# Patient Record
Sex: MALE | Race: WHITE | NOT HISPANIC OR LATINO | Employment: UNEMPLOYED | URBAN - METROPOLITAN AREA
[De-identification: names, ages, dates, MRNs, and addresses within clinical notes are randomized per-mention and may not be internally consistent; named-entity substitution may affect disease eponyms.]

---

## 2020-02-07 ENCOUNTER — OFFICE VISIT - HEALTHEAST (OUTPATIENT)
Dept: FAMILY MEDICINE | Facility: CLINIC | Age: 55
End: 2020-02-07

## 2020-02-07 DIAGNOSIS — Z13.220 SCREENING FOR HYPERLIPIDEMIA: ICD-10-CM

## 2020-02-07 DIAGNOSIS — F10.20 UNCOMPLICATED ALCOHOL DEPENDENCE (H): ICD-10-CM

## 2020-02-07 DIAGNOSIS — Z12.11 SCREEN FOR COLON CANCER: ICD-10-CM

## 2020-02-07 DIAGNOSIS — Z00.00 WELL ADULT EXAM: ICD-10-CM

## 2020-02-07 LAB
ALBUMIN SERPL-MCNC: 4.9 G/DL (ref 3.5–5)
ALP SERPL-CCNC: 56 U/L (ref 45–120)
ALT SERPL W P-5'-P-CCNC: 27 U/L (ref 0–45)
ANION GAP SERPL CALCULATED.3IONS-SCNC: 11 MMOL/L (ref 5–18)
AST SERPL W P-5'-P-CCNC: 26 U/L (ref 0–40)
BILIRUB SERPL-MCNC: 0.6 MG/DL (ref 0–1)
BUN SERPL-MCNC: 9 MG/DL (ref 8–22)
CALCIUM SERPL-MCNC: 9.9 MG/DL (ref 8.5–10.5)
CHLORIDE BLD-SCNC: 96 MMOL/L (ref 98–107)
CHOLEST SERPL-MCNC: 217 MG/DL
CO2 SERPL-SCNC: 27 MMOL/L (ref 22–31)
CREAT SERPL-MCNC: 0.76 MG/DL (ref 0.7–1.3)
FASTING STATUS PATIENT QL REPORTED: YES
GFR SERPL CREATININE-BSD FRML MDRD: >60 ML/MIN/1.73M2
GLUCOSE BLD-MCNC: 103 MG/DL (ref 70–125)
HDLC SERPL-MCNC: 94 MG/DL
LDLC SERPL CALC-MCNC: 115 MG/DL
POTASSIUM BLD-SCNC: 4.7 MMOL/L (ref 3.5–5)
PROT SERPL-MCNC: 7.5 G/DL (ref 6–8)
SODIUM SERPL-SCNC: 134 MMOL/L (ref 136–145)
TRIGL SERPL-MCNC: 40 MG/DL

## 2020-02-07 RX ORDER — MULTIPLE VITAMINS W/ MINERALS TAB 9MG-400MCG
1 TAB ORAL DAILY
Status: ON HOLD | COMMUNITY
Start: 2020-02-07 | End: 2023-10-10

## 2020-02-07 ASSESSMENT — MIFFLIN-ST. JEOR: SCORE: 1681.9

## 2020-02-11 ENCOUNTER — COMMUNICATION - HEALTHEAST (OUTPATIENT)
Dept: FAMILY MEDICINE | Facility: CLINIC | Age: 55
End: 2020-02-11

## 2020-02-20 ENCOUNTER — RECORDS - HEALTHEAST (OUTPATIENT)
Dept: ADMINISTRATIVE | Facility: OTHER | Age: 55
End: 2020-02-20

## 2020-02-20 LAB — COLOGUARD-ABSTRACT: NEGATIVE

## 2020-03-05 ENCOUNTER — RECORDS - HEALTHEAST (OUTPATIENT)
Dept: HEALTH INFORMATION MANAGEMENT | Facility: CLINIC | Age: 55
End: 2020-03-05

## 2021-06-04 VITALS
DIASTOLIC BLOOD PRESSURE: 78 MMHG | HEIGHT: 70 IN | BODY MASS INDEX: 26.09 KG/M2 | SYSTOLIC BLOOD PRESSURE: 120 MMHG | WEIGHT: 182.25 LBS | HEART RATE: 68 BPM

## 2021-06-05 NOTE — PROGRESS NOTES
Assessment/ Plan:      1. Well adult exam  Healthy Male exam completed today.  I discussed preventative measures with the patient including continuing with lifestyle modifications of a balanced diet and regular cardiovascular exercise.  I discussed self testicular exams and how to complete these.  I encouraged patient to follow-up yearly for annual physicals and sooner as needed.    2. Uncomplicated alcohol dependence (H)  Concerns for alcohol dependence with his amount of alcohol that he drinks on a regular basis.  Discussed with patient to start reducing alcohol intake in general by reducing the amount of drinks that he has per day or having alcohol free days.  In general he may need to pursue treatment in the future we will check a comprehensive metabolic panel today to assess kidney function.  - Comprehensive Metabolic Panel    3. Screen for colon cancer  Orders placed today for screening for colon cancer to pursue Cologuard.  - Cologuard    4. Screening for hyperlipidemia  Orders placed today patient is fasting.  - Lipid [place] FASTING      Subjective:      Nick Vargas is a 54 y.o. male who presents for an annual exam. The patient reports that there is not domestic violence in his life. Would like to do colon cancer screening with cologuard. He mentions some skin spots that he would like looked at.     Healthy Habits:   Regular Exercise: Yes  Sunscreen Use: No  Healthy Diet: Yes  Dental Visits Regularly: Yes  Seat Belt: Yes  Sexually active: No  Monthly Self Testicular Exams:  Yes  Hemoccults: No  Flex Sig: No  Colonoscopy: No  Lipid Profile: Yes  Glucose Screen: Yes  Prevention of Osteoporosis: N/A  Last Dexa: N/A  Guns at Home:  Yes  Guns Safety Locks:  Yes and Gun safe/class:  Yes        There is no immunization history on file for this patient.  Immunization status: would like to receive influenza vaccine.    No exam data present    Current Outpatient Medications   Medication Sig Dispense Refill      multivitamin with minerals (THERA-M) 9 mg iron-400 mcg Tab tablet Take 1 tablet by mouth daily.       No current facility-administered medications for this visit.      History reviewed. No pertinent past medical history.  History reviewed. No pertinent surgical history.  Naproxen  Family History   Problem Relation Age of Onset     Dementia Mother      Cancer Father      Heart disease Father      Heart attack Father      Social History     Socioeconomic History     Marital status: Single     Spouse name: Not on file     Number of children: Not on file     Years of education: Not on file     Highest education level: Not on file   Occupational History     Not on file   Social Needs     Financial resource strain: Not on file     Food insecurity:     Worry: Not on file     Inability: Not on file     Transportation needs:     Medical: Not on file     Non-medical: Not on file   Tobacco Use     Smoking status: Former Smoker     Last attempt to quit: 2010     Years since quitting: 10.1     Smokeless tobacco: Never Used   Substance and Sexual Activity     Alcohol use: Yes     Alcohol/week: 28.0 standard drinks     Types: 28 Cans of beer per week     Drug use: Never     Sexual activity: Not Currently   Lifestyle     Physical activity:     Days per week: Not on file     Minutes per session: Not on file     Stress: Not on file   Relationships     Social connections:     Talks on phone: Not on file     Gets together: Not on file     Attends Anabaptism service: Not on file     Active member of club or organization: Not on file     Attends meetings of clubs or organizations: Not on file     Relationship status: Not on file     Intimate partner violence:     Fear of current or ex partner: Not on file     Emotionally abused: Not on file     Physically abused: Not on file     Forced sexual activity: Not on file   Other Topics Concern     Not on file   Social History Narrative     Not on file       Review of Systems  General:  Denies  "problem  Eyes: Denies problem  Ears/Nose/Throat: Denies problem  Cardiovascular: Denies problem  Respiratory:  Denies problem  Gastrointestinal:  Denies problem  Genitourinary: Denies problem  Musculoskeletal:  Denies problem  Skin: Denies problem  Neurologic: Denies problem  Psychiatric: Denies problem  Endocrine: Denies problem  Heme/Lymphatic: Denies problem   Allergic/Immunologic: Denies problem        Objective:     Vitals:    02/07/20 0810   BP: 120/78   Pulse: 68   Weight: 182 lb 4 oz (82.7 kg)   Height: 5' 10.25\" (1.784 m)     Body mass index is 25.96 kg/m .    Physical  General Appearance: Alert, cooperative, no distress, appears stated age  Head: Normocephalic, without obvious abnormality, atraumatic  Eyes: PERRL, conjunctiva/corneas clear, EOM's intact  Ears: Normal TM's and external ear canals, both ears  Nose: Nares normal, septum midline,mucosa normal, no drainage  Throat: Lips, mucosa, and tongue normal; teeth and gums normal  Neck: Supple, symmetrical, trachea midline, no adenopathy;  thyroid: not enlarged, symmetric, no tenderness/mass/nodules; no carotid bruit or JVD  Back: Symmetric, no curvature, ROM normal, no CVA tenderness  Lungs: Clear to auscultation bilaterally, respirations unlabored  Heart: Regular rate and rhythm, S1 and S2 normal, no murmur, rub, or gallop,  Abdomen: Soft, non-tender, bowel sounds active all four quadrants,  no masses, no organomegaly  Genitourinary: decline exam.   Musculoskeletal: Normal range of motion. No joint swelling or deformity.   Extremities: Extremities normal, atraumatic, no cyanosis or edema  Skin: Skin color, texture, turgor normal, no rashes or lesions  Lymph nodes: Cervical, supraclavicular, and axillary nodes normal  Neurologic: He is alert. He has normal reflexes.   Psychiatric: He has a normal mood and affect.            "

## 2021-06-16 PROBLEM — F10.20 ALCOHOL DEPENDENCE (H): Status: ACTIVE | Noted: 2020-02-07

## 2021-06-18 NOTE — PATIENT INSTRUCTIONS - HE
Patient Instructions by Annita Gallagher CNP at 2/7/2020  8:00 AM     Author: Annita Gallagher CNP Service: -- Author Type: Nurse Practitioner    Filed: 2/7/2020  8:40 AM Encounter Date: 2/7/2020 Status: Signed    : Annita Gallagher CNP (Nurse Practitioner)         Patient Education     Prevention Guidelines, Men Ages 40 to 49  Screening tests and vaccines are an important part of managing your health. A screening test is done to find possible disorders or diseases in people who don't have any symptoms. The goal is to find a disease early so lifestyle changes can be made and you can be watched more closely to reduce the risk of disease, or to detect it early enough to treat it most effectively. Screening tests are not considered diagnostic, but are used to determine if more testing is needed. Health counseling is essential, too. Below are guidelines for these, for men ages 40 to 49. Talk with your healthcare provider to make sure youre up to date on what you need.  Screening Who needs it How often   Alcohol misuse All men in this age group At routine exams   Blood pressure All men in this age group Yearly checkup if your blood pressure reading is normal  Normal blood pressure is less than 120/80 mm Hg  If your blood pressure is higher than normal, follow the advice of your healthcare provider      Depression All men in this age group At routine exams   Type 2 diabetes or prediabetes All men beginning at age 45 and men  without symptoms at any age who are overweight or obese and have 1 or more other risk factors for diabetes At least every 3 years (yearly if blood sugar has begun to rise)   Type 2 diabetes All men with prediabetes Every year   Hepatitis C Men at increased risk for infection - talk with your healthcare provider At routine exams   High cholesterol or triglycerides All men ages 35 and older, and younger men at high risk for coronary artery disease At least every 5 years   HIV All  men At routine exams   Obesity All men in this age group At routine exams   Prostate cancer Starting at age 45, talk to healthcare provider about risks and benefits of digital rectal exam (VANI) and prostate-specific antigen (PSA) screening1 At routine exams   Syphilis Men at increased risk for infection - talk with your healthcare provider At routine exams   Tuberculosis Men at increased risk for infection - talk with your healthcare provider Check with your healthcare provider   Vision All men in this age group Every 2 to 4 years if no risk factors for eye disease2   Vaccine Who needs it How often   Chickenpox (varicella) All men in this age group who have no record of this infection or vaccine 2 doses; the second dose should be given at least 4 weeks after the first dose   Hepatitis A Men at increased risk for infection - talk with your healthcare provider 2 doses given at least 6 months apart   Hepatitis B Men at increased risk for infection - talk with your healthcare provider 3 doses over 6 months; second dose should be given 1 month after the first dose; the third dose should be given at least 2 months after the second dose and at least 4 months after the first dose   Haemophilus influenzae Type B (HIB) Men at increased risk for infection - talk with your healthcare provider 1 to 3 doses   Influenza (flu) All men in this age group Once a year   Measles, mumps, rubella (MMR) All men in this age group who have no record of these infections or vaccines 1 or 2 doses   Meningococcal Men at increased risk for infection - talk with your healthcare provider 1 or more doses   Pneumococcal conjugate vaccine (PCV13) and pneumococcal polysaccharide vaccine (PPSV23) Men at increased risk for infection - talk with your healthcare provider PCV13: 1 dose ages 19 to 65 (protects against 13 types of pneumococcal bacteria)     PPSV23: 1 to 2 doses through age 64, or 1 dose at 65 or older (protects against 23 types of  pneumococcal bacteria)      Tetanus/diphtheria/  pertussis (Td/Tdap) booster All men in this age group Td every 10 years, or a one-time dose of Tdap instead of a Td booster after age 18, then Td every 10 years   Counseling Who needs it How often   Diet and exercise Men who are overweight or obese When diagnosed, and then at routine exams   Sexually transmitted infection prevention Men at increased risk for infection - talk with your healthcare provider At routine exams   Use of daily aspirin Men ages 45 to 79 at risk for cardiovascular health problems At routine exams   Use of tobacco and the health effects it can cause All men in this age group Every exam   [address] [place] [address] [facility]   2[facility] Academy of Ophthalmology  Date Last Reviewed: 2/1/2017 2000-2019 [employer]. [address] [place] [address], [place] [address]. All rights reserved. This information is not intended as a substitute for professional medical care. Always follow your healthcare professional's instructions.           Patient Education     Addiction: Ask Yourself These Questions  Ask yourself the following questions. The answers can help you see where you might have problems caused by substance abuse. Then you can decide whether youre ready to do something about your use.     Yes No    ? ? Do you ever have trouble remembering things or concentrating on what youre doing because youre thinking about using?   ? ? Have you ever broken promises because of your substance use?   ? ? Have you ever done things when you were using that you wouldnt normally do?   ? ? Have you ever lost a job or had money troubles because of your use?   ? ? Do you ever make excuses for your use or lie to hide it?   ? ? Does someone make excuses for you when youre hung over?   ? ? Do others ever seem embarrassed about your use?   ? ? Have your children ever asked you to stop using or been afraid of you when you are using?   ? ? Has your spouse or a  girlfriend or boyfriend ever left you because of your use?   ? ? Have you ever had sexual problems that might be caused by your use?   ? ? Have you ever had severe shaking, heard voices, or thought you were seeing things after youve been using?   ? ? Have you noticed that youve gotten sick more often as you use more?   ? ? Have you tried to quit but found you just couldnt?   If you answer Yes to one or more of these questions, you may need to change or stop your substance use.  Date Last Reviewed: 10/1/2018    6456-5348 [employer]. [address] [place] [address], [place] [address]. All rights reserved. This information is not intended as a substitute for professional medical care. Always follow your healthcare professional's instructions.           Patient Education     Prevention Guidelines, Men Ages 50 to 64  Screening tests and vaccines are an important part of managing your health. A screening test is done to find diseases in people who don't have any symptoms. The goal is to find a disease early so lifestyle changes and checkups can reduce the risk of disease. Or the goal may be to detect it early to treat it most effectively. Screening tests are not used to diagnose a disease. But they are used to see if more testing is needed. Health counseling is important, too. Below are guidelines for these, for men ages 50 to 64. Talk with your healthcare provider to make sure youre up-to-date on what you need.  Screening Who needs it How often   Alcohol misuse All men in this age group At routine exams   Blood pressure All men in this age group Yearly checkup if your blood pressure is normal  Normal blood pressure is less than 120/80 mm Hg  If your blood pressure reading is higher than normal, follow the advice of your healthcare provider   Colorectal cancer All men at average risk in this age group Multiple tests are available and are used at different times. Possible tests include:     Flexible sigmoidoscopy  every 5 years, or    Colonoscopy every 10 years, or    CT colonography (virtual colonoscopy) every 5 years, or    Yearly fecal occult blood test, or    Yearly fecal immunochemical test every year, or    Stool DNA test, every 3 years  If you choose a test other than a colonoscopy and have an abnormal test result, you will need to follow-up with a colonoscopy. Screening recommendations advice vary varies among expert groups. Talk with your healthcare provider about which tests are best for you.  Some people should be screened using a different schedule because of their personal or family health history. Talk with your healthcare provider about your health history.   Depression All men in this age group At routine exams   Type 2 diabetes or prediabetes All men beginning at age 45 and men without symptoms at any age who are overweight or obese and have 1 or more other risk factors for diabetes At least every 3 years (yearly if your blood sugar has already begun to rise)   Type 2 diabetes All men with prediabetes Every year   Hepatitis C Men at increased risk for infection - talk with your healthcare provider At routine exams. All men ages 50 to 70 should be tested at least once for hepatitis C.   High cholesterol or triglycerides All men in this age group At least every 5 years   HIV Men at increased risk for infection - talk with your healthcare provider At routine exams   Lung cancer Adults age 55 to 80 who have smoked Yearly screening in smokers with 30 pack-year history of smoking or who quit within 15 years   Obesity All men in this age group At routine exams   BMI (body mass index) All men in this age group3 Every year, to help find out if you are at a healthy weight for your height   Prostate cancer Starting at age 45, talk to healthcare provider about risks and benefits of digital rectal exam (VANI) and prostate-specific antigen (PSA) screening1 At routine exams   Syphilis Men at increased risk for infection -  talk with your healthcare provider At routine exams   Tuberculosis Men at increased risk for infection - talk with your healthcare provider Ask your healthcare provider   Vision All men in this age group (2)       Men ages 40 to 54: every 2 to 4 years if no risk factors for eye disease (2)    Men ages 55 to 64: every 1 to 3 years if no risk factors for eye disease (2)  Ask your healthcare provider if you need glaucoma screening with a dilated eye exam every 2 years(2).    Vaccine Who needs it How often   Chickenpox (varicella) All men in this age group who have no record of this infection or vaccine 2 doses; second dose should be given at least 4 weeks after the first dose   Hepatitis A Men at increased risk for infection - talk with your healthcare provider 2 or 3 doses (depending on the vaccine) given at least 6 months apart; check with your healthcare provider   Hepatitis B Men at increased risk for infection - talk with your healthcare provider 2 or 3 doses (depending on the vaccine) over 6 months;check with your healthcare provider. The second dose should be given 1 month after the first dose; the third dose should be given at least 2 months after the second dose and at least 4 months after the first dose.   Haemophilus influenzae Type B (HIB) Men at increased risk for infection - talk with your healthcare provider 1 to 3 doses   Influenza (flu) All men in this age group Once a year   Measles, mumps, rubella (MMR) Men in this age group through their late 50s who have no record of these infections or vaccines 1 or 2 doses; ask your healthcare provider   Meningococcal ACWY (MenACWY) Men at increased risk for infection - talk with your healthcare provider 1 or more doses depending on your case. Then a booster every 5 years if you are still at risk. Check with your healthcare provider.   Meningococcal B (MenB) Men at increased risk for infection - talk with your healthcare provider 2 or more doses, depending on the  vaccine and your case; check with your healthcare provider   Pneumococcal conjugate vaccine (PCV13) and pneumococcal polysaccharide vaccine (PPSV23) Men at increased risk for infection - talk with your healthcare provider PCV13: 1 dose ages 19 to 65 (protects against 13 types of pneumococcal bacteria)  PPSV23: 1 to 2 doses through age 64, or 1 dose at 65 or older (protects against 23 types of pneumococcal bacteria)   Tetanus/diphtheria/pertussis (Td/Tdap) booster All men in this age group Td every 10 years, or a 1-time dose of Tdap instead of a Td booster after age 18, then Td every 10 years   Zoster recombinant (RZV) All men ages 50 and older 2 doses given 2 to 6 months apart   Zoster live (ZVL) All men ages 60 and older 1 dose   Counseling Who needs it How often   Diet and exercise Men who are overweight or obese When diagnosed, and then at routine exams   Sexually transmitted infection prevention Men at increased risk for infection - talk with your healthcare provider At routine exams   Daily low-dose aspirin Men ages 40 to 70 years who are not at increased risk of bleeding and are at high risk of cardiovascular disease 4; talk with your healthcare provider At routine exams   Use of statins Men ages 20 to 75 years who have an LDL-C level of more than 190 mg/dL (4) At routine exams, or more often as directed by your healthcare provider. Statin dosages may vary based on your overall health, risk factors, and other health conditions such as diabetes. Check with your healthcare provider. (4)   Use of tobacco and the health effects it can cause All men in this age group Every visit   1 National [facility]  2 American Academy of Ophthalmology  3 American Cancer Society  4 American College of Cardiology  Date Last Reviewed: 5/1/2019 2000-2019 [employer]. [address] [place] [address], [place] [address]. All rights reserved. This information is not intended as a substitute for professional  medical care. Always follow your healthcare professional's instructions.

## 2021-06-20 NOTE — LETTER
Letter by Annita Gallagher CNP at      Author: Annita Gallagher CNP Service: -- Author Type: --    Filed:  Encounter Date: 2/11/2020 Status: (Other)         Nick Vargas  [address] [facility] [address]             February 11, 2020         Dear Mr. Vargas,    Below are the results from your recent visit:    Resulted Orders   Lipid [place] FASTING   Result Value Ref Range    Cholesterol 217 (H) <=199 mg/dL    Triglycerides 40 <=149 mg/dL    HDL Cholesterol 94 >=40 mg/dL    LDL Calculated 115 <=129 mg/dL    Patient Fasting > 8hrs? Yes    Comprehensive Metabolic Panel   Result Value Ref Range    Sodium 134 (L) 136 - 145 mmol/L    Potassium 4.7 3.5 - 5.0 mmol/L    Chloride 96 (L) 98 - 107 mmol/L    CO2 27 22 - 31 mmol/L    Anion Gap, Calculation 11 5 - 18 mmol/L    Glucose 103 70 - 125 mg/dL    BUN 9 8 - 22 mg/dL    Creatinine 0.76 0.70 - 1.30 mg/dL    GFR MDRD Af Amer >60 >60 mL/min/1.73m2    GFR MDRD Non Af Amer >60 >60 mL/min/1.73m2    Bilirubin, Total 0.6 0.0 - 1.0 mg/dL    Calcium 9.9 8.5 - 10.5 mg/dL    Protein, Total 7.5 6.0 - 8.0 g/dL    Albumin 4.9 3.5 - 5.0 g/dL    Alkaline Phosphatase 56 45 - 120 U/L    AST 26 0 - 40 U/L    ALT 27 0 - 45 U/L    Narrative    Fasting Glucose reference range is 70-99 mg/dL per  American Diabetes Association (ADA) guidelines.       Your labs are normal. Cholesterol is mildly elevated but your HDL is nice and high which is a negative risk factor for heart disease. Your kidney and liver function is normal. Let me know if you have any questions.     Please call with questions or contact us using [facility].    Sincerely,        Electronically signed by Annita Gallagher CNP

## 2023-10-09 ENCOUNTER — TELEPHONE (OUTPATIENT)
Dept: BEHAVIORAL HEALTH | Facility: CLINIC | Age: 58
End: 2023-10-09

## 2023-10-09 ENCOUNTER — HOSPITAL ENCOUNTER (INPATIENT)
Facility: CLINIC | Age: 58
LOS: 3 days | Discharge: OTHER INSTITUTION | DRG: 897 | End: 2023-10-12
Attending: STUDENT IN AN ORGANIZED HEALTH CARE EDUCATION/TRAINING PROGRAM | Admitting: PSYCHIATRY & NEUROLOGY
Payer: COMMERCIAL

## 2023-10-09 DIAGNOSIS — F10.230 ALCOHOL DEPENDENCE WITH UNCOMPLICATED WITHDRAWAL (H): Primary | ICD-10-CM

## 2023-10-09 DIAGNOSIS — F10.930 ALCOHOL WITHDRAWAL SYNDROME WITHOUT COMPLICATION (H): ICD-10-CM

## 2023-10-09 DIAGNOSIS — F10.20 SEVERE ALCOHOL USE DISORDER (H): ICD-10-CM

## 2023-10-09 LAB
ALBUMIN SERPL BCG-MCNC: 4.5 G/DL (ref 3.5–5.2)
ALCOHOL BREATH TEST: 0.23 (ref 0–0.01)
ALP SERPL-CCNC: 163 U/L (ref 40–129)
ALT SERPL W P-5'-P-CCNC: 95 U/L (ref 0–70)
AMPHETAMINES UR QL SCN: NORMAL
ANION GAP SERPL CALCULATED.3IONS-SCNC: 15 MMOL/L (ref 7–15)
AST SERPL W P-5'-P-CCNC: 128 U/L (ref 0–45)
BARBITURATES UR QL SCN: NORMAL
BASO+EOS+MONOS # BLD AUTO: ABNORMAL 10*3/UL
BASO+EOS+MONOS NFR BLD AUTO: ABNORMAL %
BASOPHILS # BLD AUTO: 0.1 10E3/UL (ref 0–0.2)
BASOPHILS NFR BLD AUTO: 2 %
BENZODIAZ UR QL SCN: NORMAL
BILIRUB SERPL-MCNC: 0.6 MG/DL
BUN SERPL-MCNC: 6.4 MG/DL (ref 6–20)
BZE UR QL SCN: NORMAL
CALCIUM SERPL-MCNC: 9.2 MG/DL (ref 8.6–10)
CANNABINOIDS UR QL SCN: NORMAL
CHLORIDE SERPL-SCNC: 91 MMOL/L (ref 98–107)
CREAT SERPL-MCNC: 0.55 MG/DL (ref 0.67–1.17)
DEPRECATED HCO3 PLAS-SCNC: 25 MMOL/L (ref 22–29)
EGFRCR SERPLBLD CKD-EPI 2021: >90 ML/MIN/1.73M2
EOSINOPHIL # BLD AUTO: 0 10E3/UL (ref 0–0.7)
EOSINOPHIL NFR BLD AUTO: 1 %
ERYTHROCYTE [DISTWIDTH] IN BLOOD BY AUTOMATED COUNT: 13.7 % (ref 10–15)
FENTANYL UR QL: NORMAL
GLUCOSE SERPL-MCNC: 120 MG/DL (ref 70–99)
HCT VFR BLD AUTO: 37.1 % (ref 40–53)
HGB BLD-MCNC: 13.3 G/DL (ref 13.3–17.7)
HOLD SPECIMEN: NORMAL
HOLD SPECIMEN: NORMAL
IMM GRANULOCYTES # BLD: 0 10E3/UL
IMM GRANULOCYTES NFR BLD: 0 %
LYMPHOCYTES # BLD AUTO: 1.4 10E3/UL (ref 0.8–5.3)
LYMPHOCYTES NFR BLD AUTO: 40 %
MAGNESIUM SERPL-MCNC: 2.1 MG/DL (ref 1.7–2.3)
MCH RBC QN AUTO: 33.8 PG (ref 26.5–33)
MCHC RBC AUTO-ENTMCNC: 35.8 G/DL (ref 31.5–36.5)
MCV RBC AUTO: 94 FL (ref 78–100)
MONOCYTES # BLD AUTO: 0.6 10E3/UL (ref 0–1.3)
MONOCYTES NFR BLD AUTO: 18 %
NEUTROPHILS # BLD AUTO: 1.3 10E3/UL (ref 1.6–8.3)
NEUTROPHILS NFR BLD AUTO: 39 %
NRBC # BLD AUTO: 0 10E3/UL
NRBC BLD AUTO-RTO: 0 /100
OPIATES UR QL SCN: NORMAL
PCP QUAL URINE (ROCHE): NORMAL
PLATELET # BLD AUTO: 161 10E3/UL (ref 150–450)
POTASSIUM SERPL-SCNC: 4.6 MMOL/L (ref 3.4–5.3)
PROT SERPL-MCNC: 7.2 G/DL (ref 6.4–8.3)
RBC # BLD AUTO: 3.93 10E6/UL (ref 4.4–5.9)
SARS-COV-2 RNA RESP QL NAA+PROBE: NEGATIVE
SODIUM SERPL-SCNC: 131 MMOL/L (ref 135–145)
WBC # BLD AUTO: 3.4 10E3/UL (ref 4–11)

## 2023-10-09 PROCEDURE — 82075 ASSAY OF BREATH ETHANOL: CPT | Performed by: STUDENT IN AN ORGANIZED HEALTH CARE EDUCATION/TRAINING PROGRAM

## 2023-10-09 PROCEDURE — 80053 COMPREHEN METABOLIC PANEL: CPT | Performed by: STUDENT IN AN ORGANIZED HEALTH CARE EDUCATION/TRAINING PROGRAM

## 2023-10-09 PROCEDURE — 96361 HYDRATE IV INFUSION ADD-ON: CPT | Performed by: STUDENT IN AN ORGANIZED HEALTH CARE EDUCATION/TRAINING PROGRAM

## 2023-10-09 PROCEDURE — 99285 EMERGENCY DEPT VISIT HI MDM: CPT | Performed by: STUDENT IN AN ORGANIZED HEALTH CARE EDUCATION/TRAINING PROGRAM

## 2023-10-09 PROCEDURE — HZ2ZZZZ DETOXIFICATION SERVICES FOR SUBSTANCE ABUSE TREATMENT: ICD-10-PCS | Performed by: PSYCHIATRY & NEUROLOGY

## 2023-10-09 PROCEDURE — 99285 EMERGENCY DEPT VISIT HI MDM: CPT | Mod: 25 | Performed by: STUDENT IN AN ORGANIZED HEALTH CARE EDUCATION/TRAINING PROGRAM

## 2023-10-09 PROCEDURE — 128N000004 HC R&B CD ADULT

## 2023-10-09 PROCEDURE — 83735 ASSAY OF MAGNESIUM: CPT | Performed by: STUDENT IN AN ORGANIZED HEALTH CARE EDUCATION/TRAINING PROGRAM

## 2023-10-09 PROCEDURE — 80307 DRUG TEST PRSMV CHEM ANLYZR: CPT | Performed by: STUDENT IN AN ORGANIZED HEALTH CARE EDUCATION/TRAINING PROGRAM

## 2023-10-09 PROCEDURE — 96360 HYDRATION IV INFUSION INIT: CPT | Performed by: STUDENT IN AN ORGANIZED HEALTH CARE EDUCATION/TRAINING PROGRAM

## 2023-10-09 PROCEDURE — 258N000003 HC RX IP 258 OP 636: Performed by: STUDENT IN AN ORGANIZED HEALTH CARE EDUCATION/TRAINING PROGRAM

## 2023-10-09 PROCEDURE — 250N000013 HC RX MED GY IP 250 OP 250 PS 637: Performed by: STUDENT IN AN ORGANIZED HEALTH CARE EDUCATION/TRAINING PROGRAM

## 2023-10-09 PROCEDURE — 85025 COMPLETE CBC W/AUTO DIFF WBC: CPT | Performed by: STUDENT IN AN ORGANIZED HEALTH CARE EDUCATION/TRAINING PROGRAM

## 2023-10-09 PROCEDURE — 87635 SARS-COV-2 COVID-19 AMP PRB: CPT | Performed by: STUDENT IN AN ORGANIZED HEALTH CARE EDUCATION/TRAINING PROGRAM

## 2023-10-09 PROCEDURE — 250N000013 HC RX MED GY IP 250 OP 250 PS 637: Performed by: PSYCHIATRY & NEUROLOGY

## 2023-10-09 PROCEDURE — 36415 COLL VENOUS BLD VENIPUNCTURE: CPT | Performed by: STUDENT IN AN ORGANIZED HEALTH CARE EDUCATION/TRAINING PROGRAM

## 2023-10-09 RX ORDER — ONDANSETRON 4 MG/1
4 TABLET, ORALLY DISINTEGRATING ORAL EVERY 6 HOURS PRN
Status: DISCONTINUED | OUTPATIENT
Start: 2023-10-09 | End: 2023-10-09 | Stop reason: ALTCHOICE

## 2023-10-09 RX ORDER — DIAZEPAM 5 MG
5-20 TABLET ORAL EVERY 30 MIN PRN
Status: DISCONTINUED | OUTPATIENT
Start: 2023-10-09 | End: 2023-10-12 | Stop reason: HOSPADM

## 2023-10-09 RX ORDER — ATENOLOL 50 MG/1
50 TABLET ORAL DAILY PRN
Status: DISCONTINUED | OUTPATIENT
Start: 2023-10-09 | End: 2023-10-12 | Stop reason: HOSPADM

## 2023-10-09 RX ORDER — DIAZEPAM 5 MG
5-20 TABLET ORAL EVERY 30 MIN PRN
Status: DISCONTINUED | OUTPATIENT
Start: 2023-10-09 | End: 2023-10-09 | Stop reason: ALTCHOICE

## 2023-10-09 RX ORDER — LOPERAMIDE HCL 2 MG
2 CAPSULE ORAL 4 TIMES DAILY PRN
Status: DISCONTINUED | OUTPATIENT
Start: 2023-10-09 | End: 2023-10-12 | Stop reason: HOSPADM

## 2023-10-09 RX ORDER — ACETAMINOPHEN 325 MG/1
650 TABLET ORAL EVERY 4 HOURS PRN
Status: DISCONTINUED | OUTPATIENT
Start: 2023-10-09 | End: 2023-10-12 | Stop reason: HOSPADM

## 2023-10-09 RX ORDER — ONDANSETRON 4 MG/1
4 TABLET, ORALLY DISINTEGRATING ORAL EVERY 6 HOURS PRN
Status: DISCONTINUED | OUTPATIENT
Start: 2023-10-09 | End: 2023-10-12 | Stop reason: HOSPADM

## 2023-10-09 RX ORDER — MAGNESIUM HYDROXIDE/ALUMINUM HYDROXICE/SIMETHICONE 120; 1200; 1200 MG/30ML; MG/30ML; MG/30ML
30 SUSPENSION ORAL EVERY 4 HOURS PRN
Status: DISCONTINUED | OUTPATIENT
Start: 2023-10-09 | End: 2023-10-12 | Stop reason: HOSPADM

## 2023-10-09 RX ORDER — FOLIC ACID 1 MG/1
1 TABLET ORAL DAILY
Status: DISCONTINUED | OUTPATIENT
Start: 2023-10-09 | End: 2023-10-12 | Stop reason: HOSPADM

## 2023-10-09 RX ORDER — TRAZODONE HYDROCHLORIDE 50 MG/1
50 TABLET, FILM COATED ORAL
Status: DISCONTINUED | OUTPATIENT
Start: 2023-10-09 | End: 2023-10-12 | Stop reason: HOSPADM

## 2023-10-09 RX ORDER — HYDROXYZINE HYDROCHLORIDE 25 MG/1
25 TABLET, FILM COATED ORAL EVERY 4 HOURS PRN
Status: DISCONTINUED | OUTPATIENT
Start: 2023-10-09 | End: 2023-10-12 | Stop reason: HOSPADM

## 2023-10-09 RX ORDER — MULTIPLE VITAMINS W/ MINERALS TAB 9MG-400MCG
1 TAB ORAL DAILY
Status: DISCONTINUED | OUTPATIENT
Start: 2023-10-09 | End: 2023-10-12 | Stop reason: HOSPADM

## 2023-10-09 RX ORDER — MULTIPLE VITAMINS W/ MINERALS TAB 9MG-400MCG
1 TAB ORAL DAILY
Status: DISCONTINUED | OUTPATIENT
Start: 2023-10-09 | End: 2023-10-09 | Stop reason: ALTCHOICE

## 2023-10-09 RX ORDER — FOLIC ACID 1 MG/1
1 TABLET ORAL DAILY
Status: DISCONTINUED | OUTPATIENT
Start: 2023-10-09 | End: 2023-10-09 | Stop reason: ALTCHOICE

## 2023-10-09 RX ADMIN — MULTIPLE VITAMINS W/ MINERALS TAB 1 TABLET: TAB at 11:48

## 2023-10-09 RX ADMIN — DIAZEPAM 10 MG: 5 TABLET ORAL at 16:51

## 2023-10-09 RX ADMIN — MULTIPLE VITAMINS W/ MINERALS TAB 1 TABLET: TAB at 20:45

## 2023-10-09 RX ADMIN — FOLIC ACID 1 MG: 1 TABLET ORAL at 20:46

## 2023-10-09 RX ADMIN — THIAMINE HCL TAB 100 MG 100 MG: 100 TAB at 11:47

## 2023-10-09 RX ADMIN — DIAZEPAM 10 MG: 5 TABLET ORAL at 11:49

## 2023-10-09 RX ADMIN — DIAZEPAM 10 MG: 5 TABLET ORAL at 14:18

## 2023-10-09 RX ADMIN — TRAZODONE HYDROCHLORIDE 50 MG: 50 TABLET ORAL at 20:45

## 2023-10-09 RX ADMIN — SODIUM CHLORIDE, POTASSIUM CHLORIDE, SODIUM LACTATE AND CALCIUM CHLORIDE 1000 ML: 600; 310; 30; 20 INJECTION, SOLUTION INTRAVENOUS at 11:46

## 2023-10-09 RX ADMIN — DIAZEPAM 10 MG: 5 TABLET ORAL at 20:46

## 2023-10-09 RX ADMIN — THIAMINE HCL TAB 100 MG 100 MG: 100 TAB at 20:45

## 2023-10-09 RX ADMIN — FOLIC ACID 1 MG: 1 TABLET ORAL at 11:48

## 2023-10-09 ASSESSMENT — ACTIVITIES OF DAILY LIVING (ADL)
CHANGE_IN_FUNCTIONAL_STATUS_SINCE_ONSET_OF_CURRENT_ILLNESS/INJURY: NO
ADLS_ACUITY_SCORE: 28
FALL_HISTORY_WITHIN_LAST_SIX_MONTHS: NO
ADLS_ACUITY_SCORE: 28
DRESSING/BATHING_DIFFICULTY: NO
ADLS_ACUITY_SCORE: 35
TOILETING_ISSUES: NO
ADLS_ACUITY_SCORE: 35
ADLS_ACUITY_SCORE: 35
ADLS_ACUITY_SCORE: 28
WALKING_OR_CLIMBING_STAIRS_DIFFICULTY: NO
ADLS_ACUITY_SCORE: 33
CONCENTRATING,_REMEMBERING_OR_MAKING_DECISIONS_DIFFICULTY: NO
DIFFICULTY_EATING/SWALLOWING: NO
DOING_ERRANDS_INDEPENDENTLY_DIFFICULTY: NO
WEAR_GLASSES_OR_BLIND: NO
DIFFICULTY_COMMUNICATING: NO

## 2023-10-09 NOTE — ED PROVIDER NOTES
[facility] EMERGENCY DEPARTMENT ([facility])    10/09/23      ED PROVIDER NOTE   ED 18  History     Chief Complaint   Patient presents with    Alcohol Problem     Pt is seeking detox from alcohol     The history is provided by the patient and medical records.     Nick Vargas is a 58 year old male with history of longstanding alcohol use disorder (x30 years) who presents seeking detox from alcohol.  He drinks over 12 bottles of beer a day.  He does not know if he ever gets alcohol withdrawal seizures or DTs, has never been sober long enough to develop withdrawal symptoms.  He has been seen in [facility] internal medicine clinic in regards to helping him with sobriety.  He had suffered a fall and was brought in for evaluation of this by his sister, we did try to obtain [facility] records for this but unsuccessful.  His drinking has gotten bad enough where he has lost his job and he has been drinking at home.  Recently he underwent intervention at his neighbor's house with a  present.     Patient states that he drinks at least a case of beer, approximately 24, daily.  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  He has been drinking at home off of save money for the last month or so.  He is afraid of having seizures.  He denies a personal history of seizures but states he has never tried to get sober to the point where he feels like he would have had time to go into withdrawal.  He denies other substances.  He denies significant medical concerns.  Was recently evaluated at [facility] after an ED visit after a fall while intoxicated.  This event led to a push to move toward sobriety.    Past Medical History  No past medical history on file.  No past surgical history on file.  multivitamin with minerals (THERA-M) 9 mg iron-400 mcg Tab tablet      Allergies   Allergen Reactions     Naproxen Hives     Family History  Family History   Problem Relation Age of Onset    Dementia Mother     Cancer Father     Heart Disease Father     Coronary Artery Disease Father      Social History   Social History     Tobacco Use    Smoking status: Former     Types: Cigarettes     Quit date: 2010     Years since quittin.7    Smokeless tobacco: Never   Substance Use Topics    Alcohol use: Yes     Alcohol/week: 28.0 standard drinks of alcohol    Drug use: Never      Past medical history, past surgical history, medications, allergies, family history, and social history were reviewed with the patient. No additional pertinent items.      A medically appropriate review of systems was performed with pertinent positives and negatives noted in the HPI, and all other systems negative.    Physical Exam   BP: 118/76  Pulse: (!) 126  Temp: 98.2  F (36.8  C)  Resp: 16  Weight: 74.4 kg (164 lb)  SpO2: 96 %  Physical Exam  Vital Signs Reviewed  Gen: Well nourished, well developed, resting comfortably, no acute distress  HEENT: NC/AT, PERRL, EOMI, MMM  Neck: Supple, FROM  CV: Regular Tachycardia  Lungs/Chest: Normal Effort  Abd: Non-distended  MSK/Back: FROM, no visible deformity  Neuro: A&Ox3, GCS 15, CN II-XII unremarkable. Strength and sensation globally intact.  Skin: Warm, Dry, Intact, no visible lesions     ED Course, Procedures, & Data      Patient seen evaluated in ED 18  IV fluids and antiemetics ordered  Appears to be in alcohol withdrawal despite acute intoxication, withdrawal protocol and initial dose administered  Labs obtained and reviewed, consistent with chronic alcohol disease, no major acute abnormalities requiring medical admission  Patient staffed and accepted to detox unit  Procedures               Results for orders placed or performed during the hospital encounter of 10/09/23   [place] Draw     Status: None    Narrative    The following orders were created for panel order [place] Draw.  Procedure                                Abnormality         Status                     ---------                               -----------         ------                     Extra Green Top (Lithium...[[id number]]                      Final result               Extra Purple Top Tube[[id number]]                            Final result                 Please view results for these tests on the individual orders.   Extra Green Top (Lithium Heparin) Tube     Status: None   Result Value Ref Range    Hold Specimen [facility]    Extra Purple Top Tube     Status: None   Result Value Ref Range    Hold Specimen [facility]    Comprehensive metabolic panel     Status: Abnormal   Result Value Ref Range    Sodium 131 (L) 135 - 145 mmol/L    Potassium 4.6 3.4 - 5.3 mmol/L    Carbon Dioxide (CO2) 25 22 - 29 mmol/L    Anion Gap 15 7 - 15 mmol/L    Urea Nitrogen 6.4 6.0 - 20.0 mg/dL    Creatinine 0.55 (L) 0.67 - 1.17 mg/dL    GFR Estimate >90 >60 mL/min/1.73m2    Calcium 9.2 8.6 - 10.0 mg/dL    Chloride 91 (L) 98 - 107 mmol/L    Glucose 120 (H) 70 - 99 mg/dL    Alkaline Phosphatase 163 (H) 40 - 129 U/L     (H) 0 - 45 U/L    ALT 95 (H) 0 - 70 U/L    Protein Total 7.2 6.4 - 8.3 g/dL    Albumin 4.5 3.5 - 5.2 g/dL    Bilirubin Total 0.6 <=1.2 mg/dL   Magnesium     Status: Normal   Result Value Ref Range    Magnesium 2.1 1.7 - 2.3 mg/dL   Asymptomatic COVID-19 Virus (Coronavirus) by PCR Nasopharyngeal     Status: Normal    Specimen: Nasopharyngeal; Swab   Result Value Ref Range    SARS CoV2 PCR Negative Negative    Narrative    Testing was performed using the Xpert Xpress SARS-CoV-2 Assay on the Cepheid Gene-Xpert Instrument Systems. Additional information about this Emergency Use Authorization (EUA) assay can be found via the Lab Guide. This test should be ordered for the detection of SARS-CoV-2 in individuals who meet SARS-CoV-2 clinical and/or epidemiological criteria as well as from individuals without symptoms or other reasons to suspect COVID-19. Test  performance for asymptomatic patients has only been established in anterior nasal swab specimens. This test is for in vitro diagnostic use under the FDA EUA for laboratories certified under CLIA to perform high complexity testing. This test has not been FDA cleared or approved. A negative result does not rule out the presence of PCR inhibitors in the specimen or target RNA concentration below the limit of detection for the assay. The possibility of a false negative should be considered if the patient's recent exposure or clinical presentation suggests COVID-19. This test was validated by the [facility]. This laboratory is certified under the Clinical Laboratory Improvement Amendments (CLIA) as qualified to perform high complexity laboratory testing.     CBC with platelets and differential     Status: Abnormal   Result Value Ref Range    WBC Count 3.4 (L) 4.0 - 11.0 10e3/uL    RBC Count 3.93 (L) 4.40 - 5.90 10e6/uL    Hemoglobin 13.3 13.3 - 17.7 g/dL    Hematocrit 37.1 (L) 40.0 - 53.0 %    MCV 94 78 - 100 fL    MCH 33.8 (H) 26.5 - 33.0 pg    MCHC 35.8 31.5 - 36.5 g/dL    RDW 13.7 10.0 - 15.0 %    Platelet Count 161 150 - 450 10e3/uL    % Neutrophils 39 %    % Lymphocytes 40 %    % Monocytes 18 %    Mids % (Monos, Eos, Basos)      % Eosinophils 1 %    % Basophils 2 %    % Immature Granulocytes 0 %    NRBCs per 100 WBC 0 <1 /100    Absolute Neutrophils 1.3 (L) 1.6 - 8.3 10e3/uL    Absolute Lymphocytes 1.4 0.8 - 5.3 10e3/uL    Absolute Monocytes 0.6 0.0 - 1.3 10e3/uL    Mids Abs (Monos, Eos, Basos)      Absolute Eosinophils 0.0 0.0 - 0.7 10e3/uL    Absolute Basophils 0.1 0.0 - 0.2 10e3/uL    Absolute Immature Granulocytes 0.0 <=0.4 10e3/uL    Absolute NRBCs 0.0 10e3/uL   Drug Abuse Screen Qual Urine     Status: Normal   Result Value Ref Range    Amphetamines Urine Screen Negative Screen Negative    Barbituates Urine Screen Negative Screen Negative    Benzodiazepine Urine Screen  Negative Screen Negative    Cannabinoids Urine Screen Negative Screen Negative    Cocaine Urine Screen Negative Screen Negative    Fentanyl Qual Urine Screen Negative Screen Negative    Opiates Urine Screen Negative Screen Negative    PCP Urine Screen Negative Screen Negative   Alcohol breath test POCT     Status: Abnormal   Result Value Ref Range    Alcohol Breath Test 0.226 (A) 0.00 - 0.01   CBC with platelets differential     Status: Abnormal    Narrative    The following orders were created for panel order CBC with platelets differential.  Procedure                               Abnormality         Status                     ---------                               -----------         ------                     CBC with platelets and d...[[id number]]  Abnormal            Final result                 Please view results for these tests on the individual orders.   Urine Drugs of Abuse Screen     Status: Normal    Narrative    The following orders were created for panel order Urine Drugs of Abuse Screen.  Procedure                               Abnormality         Status                     ---------                               -----------         ------                     Drug Abuse Screen Qual U...[[id number]]  Normal              Final result                 Please view results for these tests on the individual orders.     Medications   diazepam (VALIUM) tablet 5-20 mg (10 mg Oral $Given 10/9/23 1418)   thiamine (B-1) tablet 100 mg (100 mg Oral $Given 10/9/23 1147)   folic acid (FOLVITE) tablet 1 mg (1 mg Oral $Given 10/9/23 1148)   multivitamin w/minerals (THERA-VIT-M) tablet 1 tablet (1 tablet Oral $Given 10/9/23 1148)   ondansetron (ZOFRAN ODT) ODT tab 4 mg (has no administration in time range)   lactated ringers BOLUS 1,000 mL (0 mLs Intravenous Stopped 10/9/23 1325)     Labs Ordered and Resulted from Time of ED Arrival to Time of ED Departure   COMPREHENSIVE METABOLIC PANEL - Abnormal       Result Value     Sodium 131 (*)     Potassium 4.6      Carbon Dioxide (CO2) 25      Anion Gap 15      Urea Nitrogen 6.4      Creatinine 0.55 (*)     GFR Estimate >90      Calcium 9.2      Chloride 91 (*)     Glucose 120 (*)     Alkaline Phosphatase 163 (*)      (*)     ALT 95 (*)     Protein Total 7.2      Albumin 4.5      Bilirubin Total 0.6     CBC WITH PLATELETS AND DIFFERENTIAL - Abnormal    WBC Count 3.4 (*)     RBC Count 3.93 (*)     Hemoglobin 13.3      Hematocrit 37.1 (*)     MCV 94      MCH 33.8 (*)     MCHC 35.8      RDW 13.7      Platelet Count 161      % Neutrophils 39      % Lymphocytes 40      % Monocytes 18      Mids % (Monos, Eos, Basos)        % Eosinophils 1      % Basophils 2      % Immature Granulocytes 0      NRBCs per 100 WBC 0      Absolute Neutrophils 1.3 (*)     Absolute Lymphocytes 1.4      Absolute Monocytes 0.6      Mids Abs (Monos, Eos, Basos)        Absolute Eosinophils 0.0      Absolute Basophils 0.1      Absolute Immature Granulocytes 0.0      Absolute NRBCs 0.0     ALCOHOL BREATH TEST POCT - Abnormal    Alcohol Breath Test 0.226 (*)    MAGNESIUM - Normal    Magnesium 2.1     COVID-19 VIRUS (CORONAVIRUS) BY PCR - Normal    SARS CoV2 PCR Negative     DRUG ABUSE SCREEN QUAL URINE - Normal    Amphetamines Urine Screen Negative      Barbituates Urine Screen Negative      Benzodiazepine Urine Screen Negative      Cannabinoids Urine Screen Negative      Cocaine Urine Screen Negative      Fentanyl Qual Urine Screen Negative      Opiates Urine Screen Negative      PCP Urine Screen Negative       No orders to display          Critical care was not performed.     Medical Decision Making  The patient's presentation was of high complexity (an acute health issue posing potential threat to life or bodily function).    The patient's evaluation involved:  ordering and/or review of 3+ test(s) in this encounter (see separate area of note for details)  discussion of management or test interpretation with another  health professional (Detox)    The patient's management necessitated high risk (a decision regarding hospitalization).    Assessment & Plan    Nick Vargas is a 58 year old male with history of longstanding alcohol use disorder (x30 years) who presents seeking detox from alcohol.  Patient presented with tachycardia and other wise normal vital signs.  Presentation is consistent with a mixed picture of acute intoxication with beginning symptoms of withdrawal.  Patient was placed on MSSA protocol with elevated score and Ativan/Valium administration was initiated.  Medical work-up was reassuring.  Signs of some mild alcoholic hepatitis with transaminitis and an AST to ALT ratio favoring alcohol as the etiology.  No major acute abnormalities requiring further medical work-up or admission.  Patient received IV fluids and benzos with improvement in his heart rate.    Patient was staffed with the detox unit with plan to admit for medical withdrawal treatment, chem dep consult and long-term substance use treatment plan.    I have reviewed the nursing notes. I have reviewed the findings, diagnosis, plan and need for follow up with the patient.    New Prescriptions    No medications on file       Final diagnoses:   Severe alcohol use disorder (H)   Alcohol withdrawal syndrome without complication (H)     I, Celeste Potts, am serving as a trained medical scribe to document services personally performed by Vitaliy Heart Jr., MD based on the provider's statements to me on October 9, 2023.  This document has been checked and approved by the attending provider.    Vitaliy JESUS Jr., MD, was physically present and have reviewed and verified the accuracy of this note documented by Celeste Potts medical scribe.      Vitaliy Heart Jr., MD   [facility], located within [facility] EMERGENCY DEPARTMENT  10/9/2023     Vitaliy Heart MD  10/09/23 8776

## 2023-10-09 NOTE — PROGRESS NOTES
10/09/23 6080   Patient Belongings   Did you bring any home meds/supplements to the hospital?  No   Patient Belongings other (see comments)   Belongings Search Yes   Clothing Search Yes   Second Staff Nick     Storage bin: insulated pants, hat, shorts with string, pants with string, sweatshirt  with string,    Box in med room: phone,  wallet (empty),  cord, ear buds, covid-19 card, keys    Security env # [id number]: $100.00 cash  [place] 's lic, Visa card, [employer] card    ADMISSION:    I am responsible for any personal items that are not sent to the safe or pharmacy. [facility] is not responsible for loss, theft or damage of any property in my possession.    Patient Signature _________________________________________ Date/Time _____________________    Staff Signature ___________________________________________ Date/Time _____________________    2nd Staff person, if patient is unable/unwilling to sign    ________________________________________________________ Date/Time _____________________    DISCHARGE:    All personal items have been returned to me.    Patient Signature _________________________________________ Date/Time _____________________    Staff Signature ___________________________________________ Date/Time _____________________

## 2023-10-09 NOTE — TELEPHONE ENCOUNTER
S: [facility] , Provider Nicci calling at 12:25 PM with clinical on a 58 year old/Male presenting for alcohol detox.     Pt had an intervention after recently losing his job.  Pt hasn't attempted to be sober for years so not sure of withdrawals.    B: Pt presents for ETOH detox.   Currently reports drinking a lot for years.    Patient reports last use was this morning.  Pt BAC: .226  Pt  denies hx of DT  Pt  denies hx of seizures. Last seizure: N/A  Pt endorsing the following symptoms of withdrawal: Shaky, Tremulous, and Tachycardic  MSSA Score: 12 and gotten Valium    Pt denies acute mental health or medical concerns.   Pt denies other drug use: None Amount/frequency: N/A    Does Pt have a detox care plan in Epic? None  Does pt present with specific needs, assistive devices, or exclusionary criteria? None  Is the patient ambulating, eating and drinking in the ED? Yes    A: Pt meets criteria to be presented for IP detox admission. Patient is voluntary    COVID Symptoms: No  If yes, COVID test required   Utox: Negative  CMP: Abnormalities: Alkaline Phosphatase is 163; AST is 128 and ALT is 95  CBC: WNL  HCG: N/A     R: Patient cleared and ready for behavioral bed placement: Yes    Pt is meeting criteria for presentation to 3A/CD    Does Patient need a Transfer Center request created? No, Pt is located within [facility] ED, [facility] ED, or [place] ED     1:22 PM Called ED and reminded to get Utox collected    2:22 PM Paged 3A On Call Provider    2:31 PM Armando accepts for 3A/CD/Armando    Updated worklist; added to the admit board; Put in que    2:35 PM Updated unit 3A and left a message for Charge RN and will admit on evening    2:37 PM Updated [facility] ED

## 2023-10-09 NOTE — ED TRIAGE NOTES
Pt was seen at [facility] recently     Triage Assessment       Row Name 10/09/23 1037       Triage Assessment (Adult)    Airway WDL WDL       Respiratory WDL    Respiratory WDL WDL       Skin Circulation/Temperature WDL    Skin Circulation/Temperature WDL WDL       Cardiac WDL    Cardiac WDL X  tachycardia       Peripheral/Neurovascular WDL    Peripheral Neurovascular WDL WDL       Cognitive/Neuro/Behavioral WDL    Cognitive/Neuro/Behavioral WDL WDL;level of consciousness    Level of Consciousness alert

## 2023-10-10 LAB
CHOLEST SERPL-MCNC: 200 MG/DL
GGT SERPL-CCNC: 408 U/L (ref 8–61)
HBA1C MFR BLD: 5.2 %
HDLC SERPL-MCNC: 120 MG/DL
LDLC SERPL CALC-MCNC: 72 MG/DL
NONHDLC SERPL-MCNC: 80 MG/DL
T4 FREE SERPL-MCNC: 0.88 NG/DL (ref 0.9–1.7)
TRIGL SERPL-MCNC: 41 MG/DL
TSH SERPL DL<=0.005 MIU/L-ACNC: 10.24 UIU/ML (ref 0.3–4.2)

## 2023-10-10 PROCEDURE — 250N000013 HC RX MED GY IP 250 OP 250 PS 637: Performed by: PSYCHIATRY & NEUROLOGY

## 2023-10-10 PROCEDURE — 83718 ASSAY OF LIPOPROTEIN: CPT | Performed by: PSYCHIATRY & NEUROLOGY

## 2023-10-10 PROCEDURE — 128N000004 HC R&B CD ADULT

## 2023-10-10 PROCEDURE — 36415 COLL VENOUS BLD VENIPUNCTURE: CPT | Performed by: PSYCHIATRY & NEUROLOGY

## 2023-10-10 PROCEDURE — 82977 ASSAY OF GGT: CPT | Performed by: PSYCHIATRY & NEUROLOGY

## 2023-10-10 PROCEDURE — 99223 1ST HOSP IP/OBS HIGH 75: CPT | Mod: AI | Performed by: PSYCHIATRY & NEUROLOGY

## 2023-10-10 PROCEDURE — 84439 ASSAY OF FREE THYROXINE: CPT | Performed by: PSYCHIATRY & NEUROLOGY

## 2023-10-10 PROCEDURE — 83036 HEMOGLOBIN GLYCOSYLATED A1C: CPT | Performed by: PSYCHIATRY & NEUROLOGY

## 2023-10-10 PROCEDURE — 84443 ASSAY THYROID STIM HORMONE: CPT | Performed by: PSYCHIATRY & NEUROLOGY

## 2023-10-10 PROCEDURE — 250N000011 HC RX IP 250 OP 636: Performed by: PSYCHIATRY & NEUROLOGY

## 2023-10-10 PROCEDURE — 99221 1ST HOSP IP/OBS SF/LOW 40: CPT | Performed by: STUDENT IN AN ORGANIZED HEALTH CARE EDUCATION/TRAINING PROGRAM

## 2023-10-10 RX ADMIN — DIAZEPAM 10 MG: 5 TABLET ORAL at 00:45

## 2023-10-10 RX ADMIN — HYDROXYZINE HYDROCHLORIDE 25 MG: 25 TABLET, FILM COATED ORAL at 13:21

## 2023-10-10 RX ADMIN — DIAZEPAM 10 MG: 5 TABLET ORAL at 16:52

## 2023-10-10 RX ADMIN — MULTIPLE VITAMINS W/ MINERALS TAB 1 TABLET: TAB at 09:09

## 2023-10-10 RX ADMIN — DIAZEPAM 10 MG: 5 TABLET ORAL at 20:25

## 2023-10-10 RX ADMIN — THIAMINE HCL TAB 100 MG 100 MG: 100 TAB at 09:09

## 2023-10-10 RX ADMIN — ONDANSETRON 4 MG: 4 TABLET, ORALLY DISINTEGRATING ORAL at 09:09

## 2023-10-10 RX ADMIN — FOLIC ACID 1 MG: 1 TABLET ORAL at 09:09

## 2023-10-10 RX ADMIN — DIAZEPAM 10 MG: 5 TABLET ORAL at 13:21

## 2023-10-10 RX ADMIN — DIAZEPAM 10 MG: 5 TABLET ORAL at 09:09

## 2023-10-10 ASSESSMENT — ACTIVITIES OF DAILY LIVING (ADL)
ADLS_ACUITY_SCORE: 28
HYGIENE/GROOMING: INDEPENDENT
ADLS_ACUITY_SCORE: 28
DRESS: SCRUBS (BEHAVIORAL HEALTH)
ADLS_ACUITY_SCORE: 28
ADLS_ACUITY_SCORE: 28
HYGIENE/GROOMING: INDEPENDENT
ADLS_ACUITY_SCORE: 28
LAUNDRY: WITH SUPERVISION
ADLS_ACUITY_SCORE: 28
ADLS_ACUITY_SCORE: 28
ORAL_HYGIENE: INDEPENDENT
ADLS_ACUITY_SCORE: 28

## 2023-10-10 ASSESSMENT — ANXIETY QUESTIONNAIRES
7. FEELING AFRAID AS IF SOMETHING AWFUL MIGHT HAPPEN: SEVERAL DAYS
4. TROUBLE RELAXING: SEVERAL DAYS
1. FEELING NERVOUS, ANXIOUS, OR ON EDGE: SEVERAL DAYS
3. WORRYING TOO MUCH ABOUT DIFFERENT THINGS: SEVERAL DAYS
GAD7 TOTAL SCORE: 7
6. BECOMING EASILY ANNOYED OR IRRITABLE: SEVERAL DAYS
5. BEING SO RESTLESS THAT IT IS HARD TO SIT STILL: SEVERAL DAYS
IF YOU CHECKED OFF ANY PROBLEMS ON THIS QUESTIONNAIRE, HOW DIFFICULT HAVE THESE PROBLEMS MADE IT FOR YOU TO DO YOUR WORK, TAKE CARE OF THINGS AT HOME, OR GET ALONG WITH OTHER PEOPLE: NOT DIFFICULT AT ALL
2. NOT BEING ABLE TO STOP OR CONTROL WORRYING: SEVERAL DAYS
GAD7 TOTAL SCORE: 7

## 2023-10-10 NOTE — DISCHARGE INSTRUCTIONS
Behavioral Discharge Planning and Instructions  THANK YOU FOR CHOOSING [facility]  3AW  [phone]    Summary: You were admitted to Station 3A on 10/9/2023 for detoxification from alcohol.  A medical exam was performed that included lab work. You have met with a  and opted to residential treatment at [facility]. Please take care and make your recovery a daily priority, Nick!  It was a pleasure working with you and the entire treatment team here wishes you the very best in your recovery!     Recommendation:  Attend and complete treatment at [facility] and follow all aftercare plans and recommendations.    [facility] intake scheduled for 10/12/23 at 1400.  [address] [place] [address]  [phone]    Main Diagnoses:  Per Dr. Camilla Roberts MD;  303.90 (F10.20) Alcohol Use Disorder Severe  Alcohol withdrawal     Major Treatments, Procedures and Findings:  You were treated for alcohol detoxification using valium administered based on the [facility] protocol. You completed a chemical dependency assessment. You had labs drawn and those results were reviewed with you. Please take a copy of your lab work with you to your next primary care provider appointment.    Symptoms to Report:  If you experience more anxiety, confusion, sleeplessness, deep sadness or thoughts of suicide, notify your treatment team or notify your primary care provider. IF ANY OF THE SYMPTOMS YOU ARE EXPERIENCING ARE A MEDICAL EMERGENCY CALL 911 IMMEDIATELY.     Lifestyle Adjustment: Adjust your lifestyle to get enough sleep, relaxation, exercise and good nutrition. Continue to develop healthy coping skills to decrease stress and promote a sober living environment. Do not use mood altering substances including alcohol, illegal drugs or addictive medications other than what is currently prescribed.     Disposition: Door to door transfer to [facility].    Facts about COVID19 at  www.cdc.gov/COVID19 and www.[place].gov/covid19    Keeping hands clean is one of the most important steps we can take to avoid getting sick and spreading germs to others.  Please wash your hands frequently and lather with soap for at least 20 seconds!    Follow-up Appointment:   You did not set up an appointment prior to discharge. Please be seen (virtual/phone visit) within 3 weeks.  ERMIAS Rodriguez, CNP   [address] [place] [address]   [phone]   Fax: [phone]     Recovery apps for your phone to locate current in person and zoom recovery meetings  [place] - meeting fe  AA  - meeting fe  Meeting guide - meeting fe  Quick NA meeting - meeting fe  Samson- has various apps    Resources:  Some AA/NA meetings are being held online however most have returned to in-person or a hybrid combination please check online to verify*  Need Support Now? If you or someone you know is struggling or in crisis, help is available. Call or text L[place]an.com or chat Zigmo  AA meetings search for them at: https://aa-intergroup.org (worldwide meeting listings)  AA meetings for [place] area can be found online at: https://a[place].org (click local online meetings listings)  NA meetings for [place] area can be found online at: https://www.nam[place].org  (click find a meeting)  AA and NA Sponsors are excellent resources for support and you can find one at any support group meeting.   Alcoholics Anonymous (https://aa.org/): for information 24 hours/day  AA Intergroup service office in [place] (http://www.a[place].org/) 556.274.8894  AA Intergroup service office in [facility]: [phone]. (http://www.a[place].org/)  Narcotics Anonymous (www.nam[place].org) (916) 594-1705  https://aafairviewriverside.org/meetings  SMART Recovery - self management for addiction recovery:  www.smartrecovery.org  Pathways ~ A Health Crisis Resource & Support Center:   868.191.9327.  https://prescribetoprevent.org/patient-education/videos/  http://www.harmreduction.org  [facility] 325-487-6489  Support Group:  AA/NA and Sponsor/support.  National [place] on Mental Illness (www.[place]art.org): 455.894.6323 or 952-831-6698.  Alcoholics Anonymous (www.alcoholics-anonymous.org): Check your phone book for your local chapter.  Suicide Awareness Voices of Education (SAVE) (www.save.org): 782-312-SWFK (4209)  National Suicide Prevention Line (www.mentalhealthmn.org): 819-297-CJTJ (4594)  Mental Health Consumer/Survivor Network of [place] (www.mhcsn.net): 909.660.5268 or 712-304-8517  Mental Health Association of [place] (www.mentalhealth.org): 283.318.9471 or 258-429-8057   Substance Abuse and Mental Health Services (www.samhsa.gov)  [place] Opioid Prevention Coalition: www.opioidcoalition.org    [place] Recovery Connection ([facility])  [facility] connects people seeking recovery to resources that help foster and sustain long-term recovery.  Whether you are seeking resources for treatment, transportation, housing, job training, education, health care or other pathways to recovery, [facility] is a great place to start.  542.129.2553.  www.[place].adhoclabs    Great Pod casts for nutrition and wellness  Listen on Apple Podcasts  Dishing Up Nutrition   [employer]   Nutrition       Understand the connection between what you eat and how you feel. Hosted by licensed nutritionists and dietitians from [employer] we share practical, real-life solutions for healthier living through nutrition.     General Medication Instructions:   See your medication sheet(s) for instructions.   Take all medications as prescribed.  Make no changes unless your primary care provider suggests them.   Go to all your primary care provider visits.  Be sure to have all your required lab tests. This way, your medicines can be refilled on time.  Do not use any forms of alcohol.    Please  Note:  If you have any questions at anytime after you are discharged please call [facility] detox unit 3AW at [phone].  [facility], Behavioral Intake [phone]  Medical Records call [phone]  Outpatient Behavioral Intake call [phone]  LP+ Wait List/Bed Availability call [phone]    Please remember to take all of your behavioral discharge planning and lab paperwork to any follow up appointments, it contains your lab results, diagnosis, medication list and discharge recommendations.      THANK YOU FOR CHOOSING [facility]

## 2023-10-10 NOTE — PLAN OF CARE
Problem: Alcohol Withdrawal  Goal: Alcohol Withdrawal Symptom Control  10/10/2023 1826 by Lauryn Lombardo, RN  Outcome: Progressing   Goal Outcome Evaluation:    1600  Pt continues in detox for alcohol, appears to be doing well.   Pt reports this is the best he has felt in a long time. Reports a good appetite.   No pain or discomfort.     Endorsed feeling anxious, fingers tremulous, feels hot and cold.  Scored 10 and 9, got 10mg valium. In the milieu, social.

## 2023-10-10 NOTE — PHARMACY-ADMISSION MEDICATION HISTORY
Admission Medication History    Admission medication history is complete. The information provided in this note is only as accurate as the sources available at the time of the update.    Information Source(s):   Patient  Dispense Report    Pertinent Information:   Patient denies taking any prescription or over-the-counter medications at this time.    Changes made to PTA medication list:  Added: None  Deleted:   Patient no longer taking:  Multivitamin w/ minerals tablet  Changed: None    Allergies reviewed with patient and updates made in EHR: yes    Medication History Completed By: Lola Regalado 10/10/2023 6:11 PM    No outpatient medications have been marked as taking for the 10/9/23 encounter (Hospital Encounter).

## 2023-10-10 NOTE — CONSULTS
[facility]  Internal Medicine Consult     Nick Vargas MRN# [id number]   Age: 58 year old YOB: 1965     Date of Admission: 10/9/2023  Date of Consult: 10/10/2023    Primary Care Provider: No Ref-Primary, Physician    Requesting Service: Behavioral Health - Uzair Plummer MD  Reason for Consult: General Medical Evaluation      SUBJECTIVE   CC:   Alcohol Withdrawal     Assessment and Plan/Recommendations:     Nick Vargas is a 58 year old male with PMHx significant for alcohol use disorder but is otherwise not on any home medications.  Patient presented to [facility] ER seeking detox and hope to enter alcohol treatment.  He was admitted to station 3 a for detox.  Internal medicine was consulted for comanagement.    # Alcohol withdrawal, hx of alcohol use disorder   MSSA 6-8 this shift.  Patient denies history of alcohol withdrawal seizures.  Pt reports drinking 20+ bottles of beer daily. Patient reportedly has struggled with drinking for over 30 years. Blood EtOH on arrival was 0.226.    - Continue MSSA   - Folvite, multi-vites, thiamine supplementation   - Further management per Psychiatry   - Seizures precautions advised    # Transaminitis 2/2 EtOH use disorder, suspect acute on chronic   , ALT 95, Alk Phos 163. Bilirubin normal. No significant abdominal pain suggestive of acute alcoholic hepatitis.    - No need to trend LFTs unless pt becomes symptomatic with severe abdominal pain, nausea and vomiting.   - Ok to give Tylenol up to 2-3 grams daily for supportive cares   - Repeat CMP in the next few weeks at PCP follow up once pt is outside of acute EtOH withdrawal.   -Continue to encourage alcohol abstinence as above    # Suspected underlying liver disease   On discussion with patient he interacts appropriately but show signs of delayed comprehension and cognition.  He also has noted thin skin, scattered purpura on upper extremities, and spider veins of lower  extremities.  All this leads to suspicion for chronic liver disease in setting of 30+ years of heavy alcohol use. Slowed cognition potentially due to high doses of valium in setting of withdrawal. Also consider cognitive deficits in the setting of prolonged alcohol use. Patient reports he feels like he had an ultrasound of his liver at outside hospital within the past week but records of this are not available.  - These concerns were discussed with patient and alcohol cessation strongly advised.  - Consider right upper quadrant ultrasound as outpatient.  We will hold off at this time as it will not change acute management.  - Patient may also benefit from seeing GI as outpatient    Currently, medically stable and internal medicine will sign off. Please contact if future questions or concerns arise. Thank you for the opportunity to be a part of this patient's care.       Dell Thibodeaux PA-C  Internal Medicine [facility] Hospitalist  Page job code [phone] (), [phone] or [phone] ([facility] and )  Text paging via DeluxeBox is appreciated  October 10, 2023         HPI:   Nick Vargas is a 58 year old male with PMHx significant for alcohol use disorder but is otherwise not on any home medications.  Patient presented to [facility] ER seeking detox and hope to enter alcohol treatment.  He was admitted to station 3 a for detox.  Internal medicine was consulted for comanagement.    This morning pt was seen while sitting at table filling out paperwork in the 3a cafeteria.  He reports overall feeling okay, is tremulous.  He has some nausea, reports small episode of emesis but no significant abdominal pain.  He reports drinking relatively heavily since the age of 18.  He has never attempted to quit drinking before, thus, he does not report any episodes of DTs or withdrawal seizures.  Denies chest pain, shortness of breath, palpitations       Past Medical History:   No past medical history on file.     Reviewed and updated in Epic.      Past Surgical History:    No past surgical history on file.      Social History:     Social History     Tobacco Use    Smoking status: Former     Types: Cigarettes     Quit date: 2010     Years since quittin.7    Smokeless tobacco: Never   Substance Use Topics    Alcohol use: Yes     Alcohol/week: 28.0 standard drinks of alcohol    Drug use: Never        Family History:     Family History   Problem Relation Age of Onset    Dementia Mother     Cancer Father     Heart Disease Father     Coronary Artery Disease Father          Allergies:     Allergies   Allergen Reactions    Naproxen Hives         Medications:   Reviewed. Please see MAR     Review of Systems:   10 point ROS of systems including Constitutional, Eyes, Respiratory, Cardiovascular, Gastroenterology, Genitourinary, Integumentary, Muscularskeletal, Psychiatric were all negative except for pertinent positives noted in my HPI.    OBJECTIVE   Physical Exam:   Vitals were reviewed  Blood pressure 135/87, pulse 105, temperature 98.2  F (36.8  C), temperature source Temporal, resp. rate 16, height 1.829 m (6'), weight 74.4 kg (164 lb), SpO2 97 %.  General: Tremulous. Slowed, at times broken speech, perseverating on certain issues. Seems to have some delayed cognition and comprehension but overall A&Ox3 and appropriately answering questions.     EYES: PERRLA, EOM. Anicteric   HENT: Atraumatic.   Lungs: No increased work of breathing.  CTAB  Cards: Mildly tachycardic but regular. No murmurs appreciated   GI: Abdomen soft, non-tender without rebound or guarding. + Bowel sounds.  Neurologic: No focal deficits, CN II-XII grossly intact  Skin: Scattered purpura and scabs on upper extremities.  Thin skin throughout.  Spider veins of lower extremities.  No jaundice.        Data:        Lab Results   Component Value Date     10/09/2023    Lab Results   Component Value Date    CHLORIDE 91 10/09/2023    CHLORIDE 96 2020    Lab Results   Component Value  Date    BUN 6.4 10/09/2023    BUN 9 02/07/2020      Lab Results   Component Value Date    POTASSIUM 4.6 10/09/2023    POTASSIUM 4.7 02/07/2020    Lab Results   Component Value Date    CO2 25 10/09/2023    CO2 27 02/07/2020    Lab Results   Component Value Date    CR 0.55 10/09/2023        Lab Results   Component Value Date    WBC 3.4 (L) 10/09/2023    HGB 13.3 10/09/2023    HCT 37.1 (L) 10/09/2023    MCV 94 10/09/2023     10/09/2023     Lab Results   Component Value Date    WBC 3.4 (L) 10/09/2023

## 2023-10-10 NOTE — PLAN OF CARE
"Goal Outcome Evaluation:    Plan of Care Reviewed With: patient Plan of Care Reviewed With: patient    Overall Patient Progress: improvingOverall Patient Progress: improving    Outcome Evaluation: Pt remains in alcohol withdrawal monitoring requiring medication to treat.    Pt MSSA scores today are 10 and 8, 10mg po valium administered x 2. Pt is moderately tremulous and is minimally sweaty. Denies hallucinations and reports overall good appetite and is relieved he's been eating and has a good appetite. Some nausea this morning, administered prn zofran x 1. Pt reports not eating well prior to admission r/t ongoing drinking. Nutrition consult placed. Total valium administered since arrival to the hospital = 70mg (30mg in ER, 40mg on 3A).    Pt endorses anxiety 7 of 10 in severity \"keep thinking about my dog\". Denies depression but does report being \"sad I'm here\" but \"ankush I got some good family, they're the ones that brought me here\". Encouraged pt to fill out his paperwork for case management which he did and was handed off to the team. Pt states wanting to go directly to treatment, \"I don't want to go home is there room upstairs\".     Will continue to monitor and intervene as warranted.   "

## 2023-10-10 NOTE — PROGRESS NOTES
Triage & Transition Services, [address]     Nick Vargas  October 10, 2023    Insurance: [employer]     Legal Status: Voluntary     SUDs Assessment Status: In progress     ROIs on file: None     Living Situation: Patient lives alone in his own home.     Current Providers and Supports:  Family, , neighbors     Encounter: Patient reported has came to to detox seeking treatment. He reported he has been drinking for over 30 years. He shared his drinking increased after his parents passed away. He shared his father was in recovery from alcohol. He reports he started drinking at age 18 and has had no periods of sobriety. He shared he has lost weight due to not eating, reporting the beers fills him up and he has no appetite. He shared losing 2 jobs due to his drinking and reported he just lays in bed all day drinking. He is no longer taking care of himself, has not been walking his dog and is drinking and driving often. Patient reported he wants to get sober so he can live out the rest of his life doing the things he has stopped doing due to drinking such as fishing, golfing and walking.     Collateral: Nine        Current Plan: [facility]     RN updated.    NOE Ambriz  Triage & Transition Services - Mental Health and Addiction Service Line  [facility] - Adult Inpatient Addiction Psychiatry Unit

## 2023-10-10 NOTE — PLAN OF CARE
Behavioral Team Discussion: (10/10/2023)    Continued Stay Criteria/Rationale: Patient admitted for  Alcohol Use Disorder.  Plan: The following services will be provided to the patient; psychiatric assessment, medication management, therapeutic milieu, individual and group support, and skills groups.   Participants: 3A Provider: Dr. Camilla Roberts MD; 3A RN: Mariano DeS ouza, RN; 3A CM's: Osmani Pena.  Summary/Recommendation: Providers will assess today for treatment recommendations, discharge planning, and aftercare plans. CM will meet with pt for discharge planning.   Medical/Physical: Patient denies any medical or physical concerns at this time.  Precautions:   Behavioral Orders   Procedures    Code 1 - Restrict to Unit    Fall precautions    Routine Programming     As clinically indicated    Status 15     Every 15 minutes.    Withdrawal precautions     Rationale for change in precautions or plan: N/A  Progress: Initial.    ASAM Dimension Scale Ratings:  Dimension 1: 3 Client tolerates and gayathri with withdrawal discomfort poorly. Client has severe intoxication, such that the client endangers self or others, or intoxication has not abated with less intensive levels of services. Client displays severe signs and symptoms; or risk of severe, but manageable withdrawal; or withdrawal worsening despite detox at less intensive level.  Dimension 2: 1 Client tolerates and gayathri with physical discomfort and is able to get the services that the client needs.  Dimension 3: 2 Client has difficulty with impulse control and lacks coping skills. Client has thoughts of suicide or harm to others without means; however, the thoughts may interfere with participation in some treatment activities. Client has difficulty functioning in significant life areas. Client has moderate symptoms of emotional, behavioral, or cognitive problems. Client is able to participate in most treatment activities.  Dimension 4: 3 Client displays  inconsistent compliance, minimal awareness of either the client's addiction or mental disorder, and is minimally cooperative.  Dimension 5: 3 Client has poor recognition and understanding of relapse and recidivism issues and displays moderately high vulnerability for further substance use or mental health problems. Client has few coping skills and rarely applies coping skills.  Dimension 6: 3 Client is not engaged in structured, meaningful activity and the client's peers, family, significant other, and living environment are unsupportive, or there is significant criminal justice system involvement.

## 2023-10-10 NOTE — PROGRESS NOTES
SUDEEP      Nick Vargas is a 58 year old year old male with a chief complaint of Alcohol Problem (Pt is seeking detox from alcohol)      S = Situation:   Patient was brought to ED by brother's wife for alcohol detox.  Patient states he has been drinking since age 18. 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 Pt states he's been drinking a case of beer 24-pack/day mixed with vodka/7Up occasionally.           B  = Background:     Patient's REBECCA was 0.226 at the Ed, Denies hx of DT, seizures, SI/SIB. Pt states that he has not been treated or thought of quitting or seeking treatment. Patient had elevated AST/ALT, sodium was 131. Received 1000 ml bolus of Lactated ringers.   Vital Signs: /84 (BP Location: Left arm, Patient Position: Sitting, Cuff Size: Adult Regular)   Pulse 120   Temp 98.2  F (36.8  C) (Oral)   Resp 20   Ht 1.829 m (6')   Wt 74.4 kg (164 lb)   SpO2 96%   BMI 22.24 kg/m    Pt exhibited tremors/shakiness, and was tachycardic. MSSA score was 12, 11, and 13 respectively at the Ed, and received 30 mg of valium.     A  =  Assessment:     Patient appears unkempt, pleasant, cooperative with staff, and stated that he has not eaten for days. Pt ate 90% of dinner, and had a good fluid intake. Patient stated that he lives alone with his dog, and has family support from brother, and sister in law. Healing scratch marks on both hands that patient stated were from his dog. Pt also has healing bruises on the right outer side of his leg/chin, and skin discoloration on left flankside due to past fall.  Reported 2 falls in past 6 months. Patient received multi vitamin, and PRN Trazadone for sleep. Present in the milieu and cooperative with medication.   Patient's MSSA score was 14 at 2000, was given 10 mg of valium.  R =   Request or Recommendation:     Patient stated that he does not want to go home for fear of relapse. Needs to have living arrangements  away from home until he becomes sober. Patient contracted for safety,universal fall precautions in place.  Patient will be followed by psychiatrist, internal medicine, and case management. Has routine labs scheduled in the morning. Nursing will continue to monitor patient for needs.

## 2023-10-10 NOTE — PROGRESS NOTES
"  Unit 3A    UNIVERSAL ADULT DIAGNOSTIC ASSESSMENT - Substance Use Disorder    Provider Name and Credentials: NOE Ambriz    PATIENT'S NAME: Nick Vargas  PREFERRED NAME: Nick  PRONOUNS:       MRN: [id number]  : 1965   Last 4 SSN: [id number]  ACCT. NUMBER:  [id number]  DATE OF SERVICE: 10/10/2023   START TIME: 1:39 pm  END TIME: 2:39 pm  PREFERRED PHONE: [phone]   EMAIL: [email or web address]   May we leave a program related message: Yes  SERVICE MODALITY:  In-person      Identifying Information:  Patient is a 58 year old,  male who was referred for an assessment by self. The pronoun use throughout this assessment reflects the patient's chosen pronoun. Patient attended the session alone.     Chief Complaint:   The reason for seeking services at this time is: \"get some help\"  The problem(s) began 18. Patient has not attempted to resolve these concerns in the past.  Patient does not appear to be in severe withdrawal, an imminent safety risk to self or others, or requiring immediate medical attention and may proceed with the assessment interview.    Social/Family History:  Patient reported he grew up in [place]. Patient was raised by mother and father. Patient reported that his childhood was great.  Patient describes current relationships with family of origin as positive.      The patient describes his cultural background as white.  Cultural influences and impact on patient's life structure, values, norms, and healthcare:  none .  Contextual influences on patient's health include: Contextual Factors: Individual Factors NA .  Patient identified his preferred language to be English. Patient reported he does not need the assistance of an  or other support involved in therapy.     Patient reported had no significant delays in developmental tasks.  Patient's highest education level was high school graduate. Patient identified the following learning problems: none reported.  Patient " reports he is able to understand written materials.    Patient's current relationship status is single for my whole life.  Patient identified his sexual orientation as heterosexual.  Patient reported having zero child([place]).     Patient's current living/housing situation involves staying in own home/apartment.  Patient lives with alone and he reports that housing is not stable. Patient identified siblings and friends as part of his support system.  Patient identified the quality of these relationships as stable and meaningful.      Patient reports he is not involved in community of chhaya activities. Patients reports spirituality impacts his recovery in the following ways:  NA.     Patient reports engaging in the following recreational/leisure activities: biking and dog walking. Patient reports engaging in the following recreation/leisure activities while using: I lay in bed all day. Patient reports the following people are supportive of recovery: siblings, , neighbors.  Patient is currently unemployed.  Patient reports his income is obtained through  pension .  Patient does not identify finances as a current stressor. Cultural and socioeconomic factors do not affect the patient's access to services.    Patient denies substance related arrests or legal issues.  Patient denies being on probation / parole / under the jurisdiction of the court.    Patient's Strengths and Limitations:  Patient identified the following strengths or resources that will help him succeed in treatment: I like to talk to people. Things that may interfere with the patient's success in treatment include: none identified.     Assessments:  The following assessments were completed by patient for this visit:  GAD7:       10/10/2023     1:00 PM   MARLEY-7 SCORE   Total Score 7     PROMIS 10-Global Health (all questions and answers displayed):       10/10/2023     1:00 PM   PROMIS 10   In general, would you say your health is: 4   In general, would  you say your quality of life is: 4   In general, how would you rate your physical health? 4   In general, how would you rate your mental health, including your mood and your ability to think? 4   In general, how would you rate your satisfaction with your social activities and relationships? 4   In general, please rate how well you carry out your usual social activities and roles. (This includes activities at home, at work and in your community, and responsibilities as a parent, child, spouse, employee, friend, etc.) 3   To what extent are you able to carry out your everyday physical activities such as walking, climbing stairs, carrying groceries, or moving a chair? 3   In the past 7 days, how often have you been bothered by emotional problems such as feeling anxious, depressed, or irritable? 2   In the past 7 days, how would you rate your fatigue on average? 4   In the past 7 days, how would you rate your pain on average, where 0 means no pain, and 10 means worst imaginable pain? 4   Global Mental Health Score 16   Global Physical Health Score 12   PROMIS TOTAL - SUBSCORES 28     GAIN-sliding scale:      10/10/2023     1:00 PM   When was the last time that you had significant problems...   with feeling very trapped, lonely, sad, blue, depressed or hopeless about the future? Past month   with sleep trouble, such as bad dreams, sleeping restlessly, or falling asleep during the day? Past Month   with feeling very anxious, nervous, tense, scared, panicked or like something bad was going to happen? Past month   with becoming very distressed & upset when something reminded you of the past? Past month   with thinking about ending your life or committing suicide? Never          10/10/2023     1:00 PM   When was the last time that you did the following things 2 or more times?   Lied or conned to get things you wanted or to avoid having to do something? Never   Had a hard time paying attention at school, work or home? Never    Had a hard time listening to instructions at school, work or home? Past month   Were a bully or threatened other people? Never   Started physical fights with other people? Never       Personal and Family Medical History:   Patient did not report a family history of mental health concerns.  Patient reports the following family history: NA  Family History   Problem Relation Age of Onset    Dementia Mother     Cancer Father     Heart Disease Father     Coronary Artery Disease Father         Patient reported the following previous mental health diagnoses: Patient denied having a mental health Dx.  Patient reports his primary mental health symptoms include: NA and these do not impact his ability to function.   Patient has not received mental health services in the past: NA.  Psychiatric Hospitalizations: None.  Patient denies a history of civil commitment.  Current mental health services/providers include:  NA.    Patient has not had a physical exam to rule out medical causes for current symptoms.  Date of last physical exam was greater than a year ago and client was encouraged to schedule an exam with PCP. The patient  has a PCP through [facility] . Patient reports no current medical concerns.  Patient denies any issues with pain.   There are significant appetite / nutritional concerns / weight changes. Patient does not report a history of an eating disorder. Patient does not report a history of head injury / trauma / cognitive impairment.      Patient reports current meds as:   No outpatient medications have been marked as taking for the 10/9/23 encounter (Hospital Encounter).       Medication Adherence:  Patient reports taking prescribed medications as prescribed.    Patient Allergies:    Allergies   Allergen Reactions    Naproxen Hives       Medical History:  No past medical history on file.    Substance Use:  Patient reported the following biological family members or relatives with chemical health issues:   "father was a recovering alcoholic.. Patient has not received substance use disorder and/or gambling treatment in the past. Patient has been to detox. Patient is not currently receiving any chemical dependency treatment. Patient reports no history of support group attendance.        Substance Age of first use Pattern and duration of use (include amounts and frequency) Date of last use     Withdrawal potential Route of administration   Has used Alcohol 18 24 beers daily 10/09/23 No oral   Has not used Marijuana            Has not used Amphetamines          Has not used Cocaine/ crack           Has not used Hallucinogens        Has not used Inhalants        Has not used Heroin        Has not used Other Opiates        Has not used Benzodiazepine          Has not used Barbiturates        Has not used Over the counter meds.        Has not used Caffeine        Has not used Nicotine         Has not used other substances not listed above:  Identify:              Patient reported the following problems as a result of their substance use: occupational / vocational problems.  Patient is concerned about substance use.     Patient reports experiencing the following withdrawal symptoms within the past 12 months: none and the following within the past 30 days: fatigue and sad/depressed feeling.   Patients reports urges to use Alcohol.  Patient reports he has used more Alcohol than intended and over a longer period of time than intended. Patient reports he has not had unsuccessful attempts to cut down or control use of Alcohol.  Patient reports longest period of abstinence was \"never attempted\" and return to use was due to NA. Patient reports he has needed to use more Alcohol to achieve the same effect.  Patient does not report diminished effect with use of same amount of Alcohol.     Patient does  report a great deal of time is spent in activities necessary to obtain, use, or recover from Alcohol effects.  Patient does  report " important social, occupational, or recreational activities are given up or reduced because of Alcohol use.  Alcohol use is continued despite knowledge of having a persistent or recurrent physical or psychological problem that is likely to have caused or exacerbated by use.  Patient reports the following problem behaviors while under the influence of substances not getting out of bed, not taking care of myself and dog and not eating.     Patient reports his recovery goals are get help and stay sober.     Patient reports substance use has not impacted his ability to function in a school setting. Patient reports substance use has impacted his ability to function in a work setting.  Patients demographics and history impact his recovery in the following ways:  NA.     Patient does not have a history of gambling concerns and/or treatment . Patient does not have other addictive behaviors he is concerned about.         Significant Losses / Trauma / Abuse / Neglect Issues:   Patient did not serve in the .  There are indications or report of significant loss, trauma, abuse or neglect issues related to: death of mother and father .  Concerns for possible neglect  NA .     Safety Assessment:   Current Safety Concerns:  [place] Suicide Severity Rating Scale (Short Version)      10/9/2023    10:38 AM 10/9/2023     6:00 PM   [place] Suicide Severity Rating (Short Version)   Over the past 2 weeks have you felt down, depressed, or hopeless? yes    Over the past 2 weeks have you had thoughts of killing yourself? no    Have you ever attempted to kill yourself? no    Q1 Wished to be Dead (Past Month)  no   Q2 Suicidal Thoughts (Past Month)  no   Q3 Suicidal Thought Method  no   Q4 Suicidal Intent without Specific Plan  no   Q5 Suicide Intent with Specific Plan  no   Q6 Suicide Behavior (Lifetime)  no   Level of Risk per Screen  low risk     Patient denies current homicidal ideation and behaviors.  Patient denies current  self-injurious ideation and behaviors.    Patient denied risk behaviors associated with substance use.  Patient denies any high risk behaviors associated with mental health symptoms.  Patient reports the following current concerns for their personal safety: None.  Patient reports there is not firearms in the house. There are no firearms in the home..     History of Safety Concerns:  Patient denied a history of homicidal ideation.     Patient denied a history of personal safety concerns.    Patient denied a history of assaultive behaviors.    Patient denied a history of sexual assault behaviors.     Patient denied a history of risk behaviors associated with substance use.  Patient denies any history of high risk behaviors associated with mental health symptoms.  Patient reports the following protective factors: pets    Risk Plan:  See Recommendations for Safety and Risk Management Plan    Review of Symptoms per patient report:  Substance Use:  blackouts, daily use, and driving under the influence     Collateral Contact Summary:   Collateral contacts contributing to this assessment:  None    If court related records were reviewed, summarize here: NA    Information from collateral contacts supported/largely agreed with information from the client and associated risk ratings.    Information in this assessment was obtained from the medical record and provided by patient who is a good historian.    Patient will have open access to their mental health medical record.    Diagnostic Criteria: 1.) Alcohol/drug is often taken in larger amounts or over a longer period than was intended.  Met for Alcohol.  2.) There is a persistent desire or unsuccessful efforts to cut down or control alcohol/drug use.  Met for Alcohol.  3.) A great deal of time is spent in activities necessary to obtain alcohol, use alcohol, or recover from its effects.  Met for Alcohol.  4.) Craving, or a strong desire or urge to use alcohol/drug.  Met for  Alcohol.  5.) Recurrent alcohol/drug use resulting in a failure to fulfill major role obligations at work, school or home.  Met for Alcohol.  6.) Continued alcohol use despite having persistent or recurrent social or interpersonal problems caused or exacerbated by the effects of alcohol/drug.  Met for Alcohol.  7.) Important social, occupational, or recreational activities are given up or reduced because of alcohol/drug use.  Met for Alcohol.  8.) Recurrent alcohol/drug use in situations in which it is physically hazardous.  Met for Alcohol.  11.) Withdrawal, as manifested by either of the following: The characteristic withdrawal syndrome for alcohol/drug (refer to Criteria A and B of the criteria set for alcohol/drug withdrawal).. Met for Alcohol.     As evidenced by self report and criteria, client meets the following DSM5 Diagnoses:   (Sustained by DSM5 Criteria Listed Above)  Alcohol Use Disorder   303.90 (F10.20) Severe In a controlled environment.    Recommendations:     1. Plan for Safety and Risk Management:  Recommended that patient call 911 or go to the local ED should there be a change in any of these risk factors..      Report to child / adult protection services was NA.     2. HERLINDA Referrals:   Recommendations: [facility] [place].  Patient reports they are willing to follow these recommendations.     Patient would like the following family or other support people involved in their treatment: NA. Patient does not have a history of opiate use.    3. Mental Health Referrals:  Patient would benefit from mental health services in the community.     4. Patient identified no cultural concerns that need to be addressed in treatment.None    5. Recommendations for treatment focus:   Grief / Loss -    Alcohol / Substance Use -   .     Clinical Substantiation:  Summary: Discussed with pt their desired outcome; reviewed living situation and community supports; reviewed type of use and risk factors for continued  use. Risk ratings/justification below:   Dimension 1 -  Acute Intoxication/Withdrawal: 0 - No Problem    Dimension 2 - Biomedical: 1 - Minor Problem    Dimension 3 - Emotional/Behavioral/Cognitive Conditions: 2 - Moderate Problem    Dimension 4 - Readiness to Change:  1 - Minor Problem    Dimension 5 - Relapse/Continued Use/ Continued Problem Potential: 4 - Extreme Problem    Dimension 6 - Recovery Environment:  4 - Extreme Problem      Placement/Program/Barriers Identified: None    Referral: [facility]      DAPAOLA Assessment ID: [id number]    Provider Name/ Credentials:  NOE Ambriz  October 10, 2023

## 2023-10-10 NOTE — H&P
Nick Vargas is a 58 year old male       History was provided by RIVER who was a fair historian.   CHIEF COMPLAINT: Alcohol    HISTORY OF PRESENT ILLNESS:      Per ED Provider Note :    Alcohol Problem        Pt is seeking detox from alcohol      The history is provided by the patient and medical records.      Nick Vargas is a 58 year old male with history of longstanding alcohol use disorder (x30 years) who presents seeking detox from alcohol.  He drinks over 12 bottles of beer a day.  He does not know if he ever gets alcohol withdrawal seizures or DTs, has never been sober long enough to develop withdrawal symptoms.  He has been seen in [facility] internal medicine clinic in regards to helping him with sobriety.  He had suffered a fall and was brought in for evaluation of this by his sister, we did try to obtain [facility] records for this but unsuccessful.  His drinking has gotten bad enough where he has lost his job and he has been drinking at home.  Recently he underwent intervention at his neighbor's house with a  present.      Patient states that he drinks at least a case of beer, approximately 24, daily.  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  He has been drinking at home off of save money for the last month or so.  He is afraid of having seizures.  He denies a personal history of seizures but states he has never tried to get sober to the point where he feels like he would have had time to go into withdrawal.  He denies other substances.  He denies significant medical concerns.  Was recently evaluated at [facility] after an ED visit after a fall while intoxicated.  This event led to a push to move toward sobriety.        Per my interview with patient:  Patient is a 58-year-old  male who is 30+ years of alcoholism.  He reports after losing his job his  drinking increased.  He was drinking up to a  Drinking a case of beer   He reports he fell and his neighbor intervened he called his  and his family and they recommended that he come get help for his drinking      Patient has been using the following substances: alcohol  Started at age21 , became a problem at 20    Withdrawal symptoms include shakey     Patient has tolerance, withdrawal, progressive use, loss of control, spending more time and more amount than intended. Patient has made attempts to quit, is experiencing cravings, and reports negative consequences.       alcohol              USE DISORDER - CRITERIA  +admits to taking larger amounts than initially intended  +admits to unsuccessful efforts to cut down or control use  +admits to spending a great deal of time in activities necessary to obtain, use and recover from  +admits to cravings or a strong desire to use   + admits to failure to fulfill obligations at work, school or home  + admits to continued use despite negative consequences  + admits to giving up important activities to use  +admits to use in situations in which it is physically dangerous        Patient does not have a history of seizures.  Patient does not have a history of delirium tremens.             Denies thoughts of suicide or harming others.      Denies auditory or visual hallucinations.     Patient quit nicotine    Patient denied any gambling    Substance Age first use First became regular or problematic Most recent use   Alcohol         Cannabis      Cocaine NONE       Stimulants NONE       Opioids NONE  Previous methadone therapy:  No  Previous buprenorphine therapy:  No  Previous naltrexone therapy: No     Sedatives NONE       Hallucinogens NONE       Inhalants NONE       Other         OTC drugs NONE       Nicotine             PSYCHIATRIC REVIEW OF SYSTEMS:         Psychiatric Review of Systems:   Depression:     Denies: depressed mood, suicidal ideation, decreased interest,  changes in sleep, changes in appetite, guilt, hopelessness, helplessness, impaired concentration, decreased energy, irritability.  Ching:       Denies: sleeplessness, increased goal-directed activities, abrupt increase in energy, pressured speech   impulsiveness, racing thoughts, increased goal-directed activities, pressured speech, increase in energy  Ching Feeling euphoric,Distractible,Impulsive,Grandiose,Talking excessively,Have energy without sleeping,Mood swings,Irritability  Psychosis:     Denies: visual hallucinations, auditory hallucinations, paranoia  Anxiety:   Denied excessive worries that are difficult to control for the past 6 months,   Chronic anxiety , not able to stop worrying impacting sleep, poor conc, irritable , muscle tension fatigue  denies any Panic attacks  PTSD:   Denies: re-experiencing past trauma, nightmares, increased arousal, avoidance of traumatic stimuli, impaired function.  OCD:   denies obsessions, patient has compulsions checking, counting symmetry, cleaning, skin picking.    ED:   Denies: restriction, binging, purging.                  PSYCHIATRIC HISTORY       Past court commitments: none  SIB /SUICIDE ATTEMPTS NONE  Psych Hosp :0  Outpatient Programs 0  Inpatient cd trt0  Out pt cd trt0  Detoxes 0  PAST PSYCH MED TRIALS        SOCIAL HISTORY                                                                           Patient is single  Patient has  0 children  patient is unemployed  Patient's housing is own house   Patient has the following stressors      Family History:   FAMILY HISTORY:   Family History   Problem Relation Age of Onset    Dementia Mother     Cancer Father     Heart Disease Father     Coronary Artery Disease Father      Family Mental Health History-  none    Substance Use Problems - present for alcoholism             PTA Medications:     Medications Prior to Admission   Medication Sig Dispense Refill Last Dose    multivitamin with minerals (THERA-M) 9 mg iron-400  mcg Tab tablet [MULTIVITAMIN WITH MINERALS (THERA-M) 9 MG IRON-400 MCG TAB TABLET] Take 1 tablet by mouth daily.   Unknown          Allergies:     Allergies   Allergen Reactions    Naproxen Hives          Labs:     Recent Results (from the past 48 hour(s))   Alcohol breath test POCT    Collection Time: 10/09/23 10:46 AM   Result Value Ref Range    Alcohol Breath Test 0.226 (A) 0.00 - 0.01   Extra Green Top (Lithium Heparin) Tube    Collection Time: 10/09/23 11:04 AM   Result Value Ref Range    Hold Specimen JIC    Extra Purple Top Tube    Collection Time: 10/09/23 11:04 AM   Result Value Ref Range    Hold Specimen JIC    Comprehensive metabolic panel    Collection Time: 10/09/23 11:04 AM   Result Value Ref Range    Sodium 131 (L) 135 - 145 mmol/L    Potassium 4.6 3.4 - 5.3 mmol/L    Carbon Dioxide (CO2) 25 22 - 29 mmol/L    Anion Gap 15 7 - 15 mmol/L    Urea Nitrogen 6.4 6.0 - 20.0 mg/dL    Creatinine 0.55 (L) 0.67 - 1.17 mg/dL    GFR Estimate >90 >60 mL/min/1.73m2    Calcium 9.2 8.6 - 10.0 mg/dL    Chloride 91 (L) 98 - 107 mmol/L    Glucose 120 (H) 70 - 99 mg/dL    Alkaline Phosphatase 163 (H) 40 - 129 U/L     (H) 0 - 45 U/L    ALT 95 (H) 0 - 70 U/L    Protein Total 7.2 6.4 - 8.3 g/dL    Albumin 4.5 3.5 - 5.2 g/dL    Bilirubin Total 0.6 <=1.2 mg/dL   Magnesium    Collection Time: 10/09/23 11:04 AM   Result Value Ref Range    Magnesium 2.1 1.7 - 2.3 mg/dL   CBC with platelets and differential    Collection Time: 10/09/23 11:04 AM   Result Value Ref Range    WBC Count 3.4 (L) 4.0 - 11.0 10e3/uL    RBC Count 3.93 (L) 4.40 - 5.90 10e6/uL    Hemoglobin 13.3 13.3 - 17.7 g/dL    Hematocrit 37.1 (L) 40.0 - 53.0 %    MCV 94 78 - 100 fL    MCH 33.8 (H) 26.5 - 33.0 pg    MCHC 35.8 31.5 - 36.5 g/dL    RDW 13.7 10.0 - 15.0 %    Platelet Count 161 150 - 450 10e3/uL    % Neutrophils 39 %    % Lymphocytes 40 %    % Monocytes 18 %    Mids % (Monos, Eos, Basos)      % Eosinophils 1 %    % Basophils 2 %    % Immature  Granulocytes 0 %    NRBCs per 100 WBC 0 <1 /100    Absolute Neutrophils 1.3 (L) 1.6 - 8.3 10e3/uL    Absolute Lymphocytes 1.4 0.8 - 5.3 10e3/uL    Absolute Monocytes 0.6 0.0 - 1.3 10e3/uL    Mids Abs (Monos, Eos, Basos)      Absolute Eosinophils 0.0 0.0 - 0.7 10e3/uL    Absolute Basophils 0.1 0.0 - 0.2 10e3/uL    Absolute Immature Granulocytes 0.0 <=0.4 10e3/uL    Absolute NRBCs 0.0 10e3/uL   Asymptomatic COVID-19 Virus (Coronavirus) by PCR Nasopharyngeal    Collection Time: 10/09/23 11:50 AM    Specimen: Nasopharyngeal; Swab   Result Value Ref Range    SARS CoV2 PCR Negative Negative   Drug Abuse Screen Qual Urine    Collection Time: 10/09/23  1:25 PM   Result Value Ref Range    Amphetamines Urine Screen Negative Screen Negative    Barbituates Urine Screen Negative Screen Negative    Benzodiazepine Urine Screen Negative Screen Negative    Cannabinoids Urine Screen Negative Screen Negative    Cocaine Urine Screen Negative Screen Negative    Fentanyl Qual Urine Screen Negative Screen Negative    Opiates Urine Screen Negative Screen Negative    PCP Urine Screen Negative Screen Negative   GGT    Collection Time: 10/10/23  7:04 AM   Result Value Ref Range     (H) 8 - 61 U/L   Hemoglobin A1c    Collection Time: 10/10/23  7:04 AM   Result Value Ref Range    Hemoglobin A1C 5.2 <5.7 %   Lipid panel    Collection Time: 10/10/23  7:04 AM   Result Value Ref Range    Cholesterol 200 (H) <200 mg/dL    Triglycerides 41 <150 mg/dL    Direct Measure  >=40 mg/dL    LDL Cholesterol Calculated 72 <=100 mg/dL    Non HDL Cholesterol 80 <130 mg/dL   TSH with free T4 reflex and/or T3 as indicated    Collection Time: 10/10/23  7:04 AM   Result Value Ref Range    TSH 10.24 (H) 0.30 - 4.20 uIU/mL   T4 free    Collection Time: 10/10/23  7:04 AM   Result Value Ref Range    Free T4 0.88 (L) 0.90 - 1.70 ng/dL         /87   Pulse 105   Temp 98.2  F (36.8  C) (Temporal)   Resp 16   Ht 1.829 m (6')   Wt 74.4 kg (164 lb)    SpO2 97%   BMI 22.24 kg/m    Weight is 164 lbs 0 oz  Body mass index is 22.24 kg/m .    Physical Exam:     ROS: 10 point ROS neg other than the symptoms noted above in the HPI.            Past Medical History:   PAST MEDICAL HISTORY: No past medical history on file.    PAST SURGICAL HISTORY: No past surgical history on file.    -    -           MENTAL STATUS EXAM:      Constitutional: General appearance of patient:  Appearance:  awake, alert, appeared as age stated, adequate groomed and slightly unkempt  Attitude:  cooperative  Eye Contact:  good  Mood:  euthymic   Affect:  congruent   Speech:  clear, coherent normal rate   Psychomotor Behavior:  no evidence of tardive dyskinesia, dystonia, or tics  Thought Process:  logical, linear and goal oriented  Associations:  no loose associations  Thought Content:  no evidence of psychotic thought and active suicidal ideation present  Denied any active suicidal /homicidation ideation plan intent   Insight:  fair  Judgment:  fair  Oriented to:  time, person, and place  Attention Span and Concentration:  intact  Recent and Remote Memory:  intact  Language:  english with appropriate syntax and vocabulary  Fund of Knowledge: appropriate  Muscle Strength and Tone: normal  Gait and Station: Normal     There are no abnormal or psychotic thoughts, no preoccupations, no overvalued ideas, no rumination, no obsessions, no compulsions, no somatic concerns, no hypochrondriasis, no ideas of reference, and no delusions.  Patient denies homicidal thoughts.   Patient denies suicidal thoughts.  Patient appears to have good judgment and good insight.     Musculoskeletal: Patient shows no abnormalities of motor activity: there is no tremor, no tic, and no dystonia.  There is no apparent muscle atrophy, strength and tone appear normal, and there are no abnormal movements.  Patient has normal gait and stance.    DISCUSSION:         Assessment:       Patient has a biological predisposition with  family history positive for alcoholism   Psychologically patient is experiencing alcoholism  Patient has these particular stressors family grief  Patient has chronic illness exacerbation leading to hospitalization progression as described.     Patient has been unable to stop using drugs in the community due to both physical and psychological symptoms.  Continued use will put the patient at risk for medical and/or psychiatric complications.      Inpatient psychiatric hospitalization is warranted at this time for safety, stabilization, and possible adjustment in medications.       Diagnoses:    alcohol use dx severe  Alcohol withdrawal           Plan:   Problem list  1# alcohol use dx severe  Alcohol withdrawal      - MSSA protocol using Valium for management of alcohol withdrawal    - Continue thiamine, folate, and multivitamin daily    MSSA    Eating Disturbances: ate and enjoyed all of it or not applicable  Tremor: 0 - no tremor  Sleep Disturbance: slept through the night or not applicable  Clouding of Sensorium: no evidence  Hallucinations: 0 - none  Quality of Contact: 0 - awareness of examiner and people around him/her  Agitation: 0 - normal activity  Paroxysmal Sweats: 1 - barely perceptible sweating  Temperature: 99.5 or below  Pulse: 4 - 100 to 109  Total MSSA Score: 6   Pt recieved 10 mg of valium since morning , and 60 mg po qhs  2#patient has elevated liver enzymes AST is 128 ALT is 95 most likely from alcoholism nonviral suspected    3#patient has elevated  from alcoholism  4#patient has hyponatremia sodium is 131 we will put internal medicine consult  5#patient has leukopenia WBC is low 3.4?  Bone marrow suppression report internal medicine consult      - Consider anti-craving medications prior to discharge. Pt willing to review additional information about both naltrexone and Antabuse.  -Alcohol withdrawal nausea prn Zofran as needed for nausea     hydroxyzine 25 mg q4h prn for acute  anxiety  Trazodone 50 mg at bedtime prn for sleep disturbances       Patient has been unable to stop using drugs in the community due to both physical and psychological symptoms.  Continued use will put the patient at risk for medical and/or psychiatric complications.    I HAVE REVIEWED LABS WITH PT AND TALKED ABOUT RESULTS WITH PT  I HAVE REVIEWED AND SUMMARIZED OLD RECORDS including his medication reconcilation of his home medications  and PDMP was reviewed  I HAVE SPOKEN WITH RN ABOUT MEDICATIONS AND DETOX SCORES  I HAVE SPOKEN WITH CM ABOUT PTS TREATMENT OPTIONS     Discussed in detail about patient's smoking patient was advised to quit patient was told about the impact of smoking.  Patient's willingness to quit was assessed.  I provided methods and skills for cessation including medication management nicotine gum patch.  Patient did not set a quit date.  Patient is interested in quitting .we discussed pharmacotherapy options .patient agreed to take nicotine gum patch lozenge.  We discussed behavioral change techniques when craving nicotine including deep breathing drinking glass of water, taking a walk.            Laboratory/Imaging:    Liver Function Studies -   Recent Labs   Lab Test 10/09/23  1104   PROTTOTAL 7.2   ALBUMIN 4.5   BILITOTAL 0.6   ALKPHOS 163*   *   ALT 95*      Last Comprehensive Metabolic Panel:  Sodium   Date Value Ref Range Status   10/09/2023 131 (L) 135 - 145 mmol/L Final     Comment:     Reference intervals for this test were updated on 09/26/2023 to more accurately reflect our healthy population. There may be differences in the flagging of prior results with similar values performed with this method. Interpretation of those prior results can be made in the context of the updated reference intervals.      Potassium   Date Value Ref Range Status   10/09/2023 4.6 3.4 - 5.3 mmol/L Final   02/07/2020 4.7 3.5 - 5.0 mmol/L Final     Chloride   Date Value Ref Range Status   10/09/2023 91  (L) 98 - 107 mmol/L Final   02/07/2020 96 (L) 98 - 107 mmol/L Final     Carbon Dioxide (CO2)   Date Value Ref Range Status   10/09/2023 25 22 - 29 mmol/L Final   02/07/2020 27 22 - 31 mmol/L Final     Anion Gap   Date Value Ref Range Status   10/09/2023 15 7 - 15 mmol/L Final   02/07/2020 11 5 - 18 mmol/L Final     Glucose   Date Value Ref Range Status   10/09/2023 120 (H) 70 - 99 mg/dL Final   02/07/2020 103 70 - 125 mg/dL Final     Urea Nitrogen   Date Value Ref Range Status   10/09/2023 6.4 6.0 - 20.0 mg/dL Final   02/07/2020 9 8 - 22 mg/dL Final     Creatinine   Date Value Ref Range Status   10/09/2023 0.55 (L) 0.67 - 1.17 mg/dL Final     GFR Estimate   Date Value Ref Range Status   10/09/2023 >90 >60 mL/min/1.73m2 Final   02/07/2020 >60 >60 mL/min/1.73m2 Final     Calcium   Date Value Ref Range Status   10/09/2023 9.2 8.6 - 10.0 mg/dL Final     Bilirubin Total   Date Value Ref Range Status   10/09/2023 0.6 <=1.2 mg/dL Final     Alkaline Phosphatase   Date Value Ref Range Status   10/09/2023 163 (H) 40 - 129 U/L Final     ALT   Date Value Ref Range Status   10/09/2023 95 (H) 0 - 70 U/L Final     Comment:     Reference intervals for this test were updated on 6/12/2023 to more accurately reflect our healthy population. There may be differences in the flagging of prior results with similar values performed with this method. Interpretation of those prior results can be made in the context of the updated reference intervals.       AST   Date Value Ref Range Status   10/09/2023 128 (H) 0 - 45 U/L Final     Comment:     Reference intervals for this test were updated on 6/12/2023 to more accurately reflect our healthy population. There may be differences in the flagging of prior results with similar values performed with this method. Interpretation of those prior results can be made in the context of the updated reference intervals.                   Medical treatment/interventions:  Medical concerns: As above    -  "Consults: IM consult placed. Appreciate assistance.     Legal Status: Voluntary     Safety Assessment:   Checks: Status 15  Pt has not required locked seclusion or restraints in the past 24 hours to maintain safety, please refer to RN documentation for further details.    The risks, benefits, alternatives and side effects have been discussed and are understood by the patient.       Patient will be treated in therapeutic milieu with appropriate individual and group therapies as described.      Clinical Global Impressions  First:7     Most recent:7     Disposition: Pending clinical stabilization. Pt does  appear interested in COMPLETE DETOX AND DO TRT  Length of stay 3-5 days    Attestation:  Patient has been seen and evaluated by me, Dr Camilla Roberts MD  The patient was counseled on nature of illness and treatment plan/options  Care was coordinated with treatment team          \"Much or all of the text in this note was generated through the use of Dragon Dictate voice to text software. Errors in spelling or words which appear to be out of contact are unintentional, may be present due having escaped editing\"    "

## 2023-10-10 NOTE — PLAN OF CARE
Problem: Alcohol Withdrawal  Goal: Alcohol Withdrawal Symptom Control  Outcome: Progressing   Goal Outcome Evaluation:         Pt is in alcohol detox on MSSA protocol with prn valium.  Pt scored 8 and 6 requiring medication with prn valium 10mg x1.  Pt endorsed cold sweats and anxiety.  Otherwise states this is the most he has been able to sleep in a while.     B/P: 127/85, T: 98, P: 103, R: 18

## 2023-10-11 PROCEDURE — 99231 SBSQ HOSP IP/OBS SF/LOW 25: CPT | Performed by: PSYCHIATRY & NEUROLOGY

## 2023-10-11 PROCEDURE — 128N000004 HC R&B CD ADULT

## 2023-10-11 PROCEDURE — 250N000013 HC RX MED GY IP 250 OP 250 PS 637: Performed by: PSYCHIATRY & NEUROLOGY

## 2023-10-11 RX ORDER — LANOLIN ALCOHOL/MO/W.PET/CERES
100 CREAM (GRAM) TOPICAL DAILY
Qty: 30 TABLET | Refills: 0 | Status: SHIPPED | OUTPATIENT
Start: 2023-10-12

## 2023-10-11 RX ORDER — MULTIPLE VITAMINS W/ MINERALS TAB 9MG-400MCG
1 TAB ORAL DAILY
Qty: 30 TABLET | Refills: 0 | Status: SHIPPED | OUTPATIENT
Start: 2023-10-12

## 2023-10-11 RX ORDER — TRAZODONE HYDROCHLORIDE 50 MG/1
50 TABLET, FILM COATED ORAL
Qty: 30 TABLET | Refills: 0 | Status: SHIPPED | OUTPATIENT
Start: 2023-10-11

## 2023-10-11 RX ADMIN — MULTIPLE VITAMINS W/ MINERALS TAB 1 TABLET: TAB at 08:28

## 2023-10-11 RX ADMIN — DIAZEPAM 10 MG: 5 TABLET ORAL at 08:28

## 2023-10-11 RX ADMIN — FOLIC ACID 1 MG: 1 TABLET ORAL at 08:28

## 2023-10-11 RX ADMIN — HYDROXYZINE HYDROCHLORIDE 25 MG: 25 TABLET, FILM COATED ORAL at 08:28

## 2023-10-11 RX ADMIN — THIAMINE HCL TAB 100 MG 100 MG: 100 TAB at 08:29

## 2023-10-11 ASSESSMENT — ACTIVITIES OF DAILY LIVING (ADL)
ADLS_ACUITY_SCORE: 28
DRESS: SCRUBS (BEHAVIORAL HEALTH)
ADLS_ACUITY_SCORE: 28
HYGIENE/GROOMING: INDEPENDENT
ADLS_ACUITY_SCORE: 28
LAUNDRY: WITH SUPERVISION
ORAL_HYGIENE: INDEPENDENT
ADLS_ACUITY_SCORE: 28

## 2023-10-11 NOTE — PLAN OF CARE
"  Problem: Alcohol Withdrawal  Goal: Alcohol Withdrawal Symptom Control  10/11/2023 0026 by Lauryn Lombardo RN  Outcome: Progressing    Pt continues in alcohol detox on MSSA with prn valium.  Pt denied withdrawal symptoms. States he feels fine. No c/o pain or discomfort.   \" I feel pretty good\"  MSSA :2 and 3      Pt has had a total of 90mg of valium. 60mg on 3A , 30mg in ED.   "

## 2023-10-11 NOTE — PROGRESS NOTES
Met with client to complete comp assessment. Patient requested to go to [facility].   Writer has sent referral to [facility] and faxed assessment to  for authorization.

## 2023-10-11 NOTE — PROGRESS NOTES
[facility], [place]   Psychiatric Progress Note        Interim history   This is a 58 year old male with  alcohol use dx severe  Alcohol withdrawal    .Pt seen in rounds.   The patient's care was discussed with the treatment team during the daily team meeting and/or staff's chart notes were reviewed.  Staff report patient has been visible in the milieu,  no acute eventsovernight.     Patient's mood is better  Energy Level:MODERATE  Sleep:No concerns, sleeps well through night  Appetite:fair motivation interest   Deneid Suicidal/homicidal ideation/plan intent.    Denied any psychosis  No prior suicde attempts  No access to gun  Pt is in alcohol withdrawal still being monitered every 4 hrs for it,   Pt mssa score are monitered  Tolerating meds and has no side effects.              Medications:     Current Facility-Administered Medications   Medication    acetaminophen (TYLENOL) tablet 650 mg    alum & mag hydroxide-simethicone (MAALOX) suspension 30 mL    atenolol (TENORMIN) tablet 50 mg    diazepam (VALIUM) tablet 5-20 mg    folic acid (FOLVITE) tablet 1 mg    hydrOXYzine (ATARAX) tablet 25 mg    loperamide (IMODIUM) capsule 2 mg    multivitamin w/minerals (THERA-VIT-M) tablet 1 tablet    ondansetron (ZOFRAN ODT) ODT tab 4 mg    thiamine (B-1) tablet 100 mg    traZODone (DESYREL) tablet 50 mg             Allergies:     Allergies   Allergen Reactions    Naproxen Hives            Psychiatric Examination:   Blood pressure (!) 131/90, pulse 81, temperature 97.7  F (36.5  C), temperature source Tympanic, resp. rate 16, height 1.829 m (6'), weight 74.4 kg (164 lb), SpO2 95%.  Weight is 164 lbs 0 oz  Body mass index is 22.24 kg/m .    Appearance:  awake, alert and adequately groomed  Attitude:  cooperative  Eye Contact:  good  Mood:  better  Affect:  appropriate and in normal range and mood congruent  Speech:  clear, coherent rate /rhythm are good  Psychomotor Behavior:  no evidence of tardive  dyskinesia, dystonia, or tics and intact station, gait and muscle tone  Throught Process:  logical  Associations:  no loose associations  Thought Content:  no evidence of suicidal ideation or homicidal ideation, no evidence of psychotic thought, no auditory hallucinations present, and no visual hallucinations present  Insight:  fair  Judgement:  fair  Oriented to:  time, person, and place  Attention Span and Concentration:  intact  Recent and Remote Memory:  intact  Language fund of knowledge are adequate         Labs:   No results found for this or any previous visit (from the past 24 hour(s)).      DX  alcohol use dx severe  Alcohol withdrawal         PLAN   Alcohol intoxication/withdrawal, presently is on MSSA protocol with Valium. Continue the same MSSA protocol as ordered. Continue thiamine 100 mg p.o. daily, M.V.I. one p.o. daily and folate 1 mg p.o. Daily  Will continue mssa protocal to detox off alcohol on valium,  Pt is c/o of termor , agitation poor sleep and poor appetite, he has sweats, feels shakey  On mssa client scored scored  8today and  needed needed  10mg po as of yet , total dose since admission was 100mg     MSSA    Eating Disturbances: ate and enjoyed all of it or not applicable  Tremor: 0 - no tremor  Sleep Disturbance: slept through the night or not applicable  Clouding of Sensorium: no evidence  Hallucinations: 0 - none  Quality of Contact: 0 - awareness of examiner and people around him/her  Agitation: 0 - normal activity  Paroxysmal Sweats: 0 - no observed sweating  Temperature: 99.5 or below  Pulse: 2 - 80 to 89  Total MSSA Score: 3    Laboratory/Imaging: reviewed with patient   Consults: internal medicine consult reviewed  Patient will be treated in therapeutic milieu with appropriate individual and group therapies as described.  PDMP CHECKED     Supportive psychotheraoy provided, rodrigo talked about recovery enviroment, relapse prevention, triggers to use.  Discussed with patient many  issues of addiction,triggers, relapse, and establishing a solid recovery program.  Asked pt to be med complinat   Medical diagnoses to be addressed this admission:    Plan:     Assessment and Plan/Recommendations:      Nick Vargas is a 58 year old male with PMHx significant for alcohol use disorder but is otherwise not on any home medications.  Patient presented to [facility] ER seeking detox and hope to enter alcohol treatment.  He was admitted to station 3 a for detox.  Internal medicine was consulted for comanagement.     # Alcohol withdrawal, hx of alcohol use disorder   MSSA 6-8 this shift.  Patient denies history of alcohol withdrawal seizures.  Pt reports drinking 20+ bottles of beer daily. Patient reportedly has struggled with drinking for over 30 years. Blood EtOH on arrival was 0.226.    - Continue MSSA   - Folvite, multi-vites, thiamine supplementation   - Further management per Psychiatry   - Seizures precautions advised     # Transaminitis 2/2 EtOH use disorder, suspect acute on chronic   , ALT 95, Alk Phos 163. Bilirubin normal. No significant abdominal pain suggestive of acute alcoholic hepatitis.    - No need to trend LFTs unless pt becomes symptomatic with severe abdominal pain, nausea and vomiting.   - Ok to give Tylenol up to 2-3 grams daily for supportive cares   - Repeat CMP in the next few weeks at PCP follow up once pt is outside of acute EtOH withdrawal.   -Continue to encourage alcohol abstinence as above     # Suspected underlying liver disease   On discussion with patient he interacts appropriately but show signs of delayed comprehension and cognition.  He also has noted thin skin, scattered purpura on upper extremities, and spider veins of lower extremities.  All this leads to suspicion for chronic liver disease in setting of 30+ years of heavy alcohol use and potential for early Warnicke's encephalopathy.  Patient reports he feels like he had an ultrasound of his liver at outside  hospital within the past week but records of this are not available.  - These concerns were discussed with patient and alcohol cessation strongly advised.  - Consider right upper quadrant ultrasound as outpatient.  We will hold off at this time as it will not change acute management.  - Patient may also benefit from seeing GI as outpatient     Currently, medically stable and internal medicine will sign off. Please contact if future questions or concerns arise. Thank you for the opportunity to be a part of this patient's care.      Legal Status: voluntary    Safety Assessment:   Checks:  15 min  Precautions: withdrawal precautions  Pt has not required locked seclusion or restraints in the past 24 hours to maintain safety, please refer to RN documentation for further details.  Discussed with patient many issues of addiction,triggers, relapse, and establishing a solid recovery program.  Able to give informed consent:  YES   Discussed Risks/Benefits/Side Effects/Alternatives: YES    After discussion of the indications, risks, benefits, alternatives and consequences of no treatment, the patient elects to complete detox and do trt    Over 50% of my time was spent on the following: Coordinating care    I HAVE SPOKEN WITH RN ABOUT MEDICATIONS AND DETOX SCORES  I HAVE SPOKEN WITH CM ABOUT PTS TREATMENT OPTIONS       Counseled the patient on the importance of having a recovery program in addition to medication to manage recovery.  Components include avoiding isolating, having willingness to change, avoiding triggers and managing cravings. Encouraged having some type of sober network and practicing honesty with trusted support person(s).   Significant education and counseling spent on: how substance use disorders and dependence occur, and how it can become a chronic relapsing and remitting medical condition.  In addition, the pharmacology of medical treatments including   the importance of follow up, being medication  compliant.

## 2023-10-11 NOTE — PLAN OF CARE
"Goal Outcome Evaluation:    Plan of Care Reviewed With: patient Plan of Care Reviewed With: patient    Overall Patient Progress: improvingOverall Patient Progress: improving    Outcome Evaluation: Remains in alcohol withdrawal with symptoms overall improving day to day.    MSSA scores today are 8 and 6, 10mg po valium administered x 1. Pt moderately tremulous but improving as the day progresses. Does not appear sweaty. Denies hallucinations. Total valium administered since arrival to the hospital 100mg (30mg in ER, 70mg on 3A).     Pt states interest in going directly to treatment once detox is complete \"I don't want to go home\". Endorses anxiety rated 6 of 10 in severity today (was rated 7 yesterday). Pt denies depression. Denies suicidal ideation plans or intent. Endorses sadness \"sad I can't see my family and dog\". Pt is calm and cooperative with all interactions. Discussed with patient medications to assist him in retaining sobriety. Given handouts from [facility] [employer] for antabuse, campral and naltrexone and asked him to read through the material and ask his nurse or Dr. Roberts if he has any follow up questions.    Will continue to monitor and intervene as warranted.   "

## 2023-10-12 ENCOUNTER — HOSPITAL ENCOUNTER (OUTPATIENT)
Dept: BEHAVIORAL HEALTH | Facility: CLINIC | Age: 58
Discharge: HOME | End: 2023-10-12
Attending: FAMILY MEDICINE
Payer: COMMERCIAL

## 2023-10-12 VITALS
OXYGEN SATURATION: 96 % | RESPIRATION RATE: 16 BRPM | TEMPERATURE: 97.8 F | WEIGHT: 164 LBS | HEART RATE: 86 BPM | HEIGHT: 72 IN | BODY MASS INDEX: 22.21 KG/M2 | SYSTOLIC BLOOD PRESSURE: 128 MMHG | DIASTOLIC BLOOD PRESSURE: 95 MMHG

## 2023-10-12 PROBLEM — F19.20 CHEMICAL DEPENDENCY (H): Status: ACTIVE | Noted: 2023-10-12

## 2023-10-12 PROCEDURE — 1002N00001 HC LODGING PLUS FACILITY CHARGE ADULT

## 2023-10-12 PROCEDURE — 99239 HOSP IP/OBS DSCHRG MGMT >30: CPT | Performed by: PSYCHIATRY & NEUROLOGY

## 2023-10-12 PROCEDURE — H2035 A/D TX PROGRAM, PER HOUR: HCPCS

## 2023-10-12 PROCEDURE — 250N000013 HC RX MED GY IP 250 OP 250 PS 637: Performed by: PSYCHIATRY & NEUROLOGY

## 2023-10-12 RX ORDER — LORATADINE 10 MG/1
10 TABLET ORAL DAILY PRN
COMMUNITY

## 2023-10-12 RX ORDER — IBUPROFEN 200 MG
200-400 TABLET ORAL EVERY 6 HOURS PRN
COMMUNITY

## 2023-10-12 RX ORDER — ACETAMINOPHEN 325 MG/1
325-650 TABLET ORAL EVERY 4 HOURS PRN
COMMUNITY

## 2023-10-12 RX ORDER — MAGNESIUM HYDROXIDE/ALUMINUM HYDROXICE/SIMETHICONE 120; 1200; 1200 MG/30ML; MG/30ML; MG/30ML
30 SUSPENSION ORAL EVERY 6 HOURS PRN
COMMUNITY

## 2023-10-12 RX ORDER — CHLORAL HYDRATE 500 MG
1 CAPSULE ORAL DAILY
COMMUNITY

## 2023-10-12 RX ORDER — AMOXICILLIN 250 MG
2 CAPSULE ORAL DAILY PRN
COMMUNITY

## 2023-10-12 RX ORDER — LANOLIN ALCOHOL/MO/W.PET/CERES
3 CREAM (GRAM) TOPICAL
COMMUNITY

## 2023-10-12 RX ORDER — GUAIFENESIN/DEXTROMETHORPHAN 100-10MG/5
10 SYRUP ORAL EVERY 4 HOURS PRN
COMMUNITY

## 2023-10-12 RX ADMIN — THIAMINE HCL TAB 100 MG 100 MG: 100 TAB at 09:07

## 2023-10-12 RX ADMIN — FOLIC ACID 1 MG: 1 TABLET ORAL at 09:07

## 2023-10-12 RX ADMIN — MULTIPLE VITAMINS W/ MINERALS TAB 1 TABLET: TAB at 09:07

## 2023-10-12 ASSESSMENT — ACTIVITIES OF DAILY LIVING (ADL)
LAUNDRY: WITH SUPERVISION
ORAL_HYGIENE: INDEPENDENT
ADLS_ACUITY_SCORE: 28
HYGIENE/GROOMING: INDEPENDENT
DRESS: SCRUBS (BEHAVIORAL HEALTH)
ADLS_ACUITY_SCORE: 28

## 2023-10-12 ASSESSMENT — ANXIETY QUESTIONNAIRES
IF YOU CHECKED OFF ANY PROBLEMS ON THIS QUESTIONNAIRE, HOW DIFFICULT HAVE THESE PROBLEMS MADE IT FOR YOU TO DO YOUR WORK, TAKE CARE OF THINGS AT HOME, OR GET ALONG WITH OTHER PEOPLE: SOMEWHAT DIFFICULT
7. FEELING AFRAID AS IF SOMETHING AWFUL MIGHT HAPPEN: MORE THAN HALF THE DAYS
3. WORRYING TOO MUCH ABOUT DIFFERENT THINGS: MORE THAN HALF THE DAYS
6. BECOMING EASILY ANNOYED OR IRRITABLE: SEVERAL DAYS
4. TROUBLE RELAXING: MORE THAN HALF THE DAYS
1. FEELING NERVOUS, ANXIOUS, OR ON EDGE: SEVERAL DAYS
5. BEING SO RESTLESS THAT IT IS HARD TO SIT STILL: SEVERAL DAYS

## 2023-10-12 NOTE — PLAN OF CARE
Goal Outcome Evaluation:    Plan of Care Reviewed With: patient Plan of Care Reviewed With: patient    Overall Patient Progress: improvingOverall Patient Progress: improving    Outcome Evaluation: Removed from alcohol withdrawal monitoring.    Patient has not required any valium for alcohol detox since 10/11/2023 at 0828. All MSSA scores since that time have been less than 8. Total valium administered since arrival to the hospital = 100mg (30mg in ER, 70mg on 3A). Pt is now removed from alcohol detox monitoring status. Await disposition likely to treatment later today at [facility], scheduled for 1400 admission to [facility].

## 2023-10-12 NOTE — PLAN OF CARE
Problem: Alcohol Withdrawal  Goal: Alcohol Withdrawal Symptom Control  Outcome: Progressing     Pt is monitored for alcohol withdrawal, MSSA of 3, 4. Pt did not receive valium this shift. Pt last received valium 10/11 at 08:28.    Pt plan is to discharge to [facility] on Thursday 10/12. Pt stated he feels good about this plan.    Pt visible in milieu and social with peers. Pt attended evening AA meeting.

## 2023-10-12 NOTE — PROGRESS NOTES
Initial Services Plan    Before your first treatment group, please do the following    Immediate health & safety concerns: Look for sober housing and a supportive social network.  Look for a support network (such as AA, NA, DBT group, a Sikh group, etc.)    Suggestions for client during the time between intake & completion of treatment plan:  Tour your treatment center (unit or outpatient clinic).  Introduce yourself to the treatment group.  Spend time getting to know your peers.  Complete your psycho-social paperwork.  Complete the problem list for your treatment plan.  Start drug and alcohol use history.  Review your patient or client handbook.    Client issues to be addressed in the first treatment sessions:  Other Pt reports that he would like to use his phone to contact neighbor and brother    NOE Barnhart  10/12/2023

## 2023-10-12 NOTE — PROGRESS NOTES
This [facility] patient, or other [facility] patient is a categorical Vulnerable Adult according to [place] Statute 626.5572 [address] [place].    Susceptibility to abuse by others     1.  Have you ever been emotionally abused by anyone?          No    2.  Have you ever been bullied, or physically assaulted by anyone?        No    3.  Have you ever been sexually taken advantage of or sexually assaulted?        No    4.  Have you ever been financially taken advantage of?        No    5.  Have you ever hurt yourself intentionally such as burns or cuts?       No    Risk of abusing other vulnerable adults     1.  Have you ever bullied, berated or emotionally degraded someone else?       No    2.  Have you ever financially taken advantage of someone else?       No    3.  Have you ever sexually exploited or assaulted another person?       No    4.  Have you ever gotten into fights, verbal arguments or physically assaulted someone?          No    Based on the above information:    This [facility] patient, or other [facility]idential[facility] patient is a categorical Vulnerable Adult according to [place] Stat[place] 626.5572 [address].                                                                                                                                                                                                       This person does not have a history of abuse, but is assessed as stable and not in need of an individual abuse prevention plan beyond the program abuse prevention plan.

## 2023-10-12 NOTE — PROGRESS NOTES
Name: Nick Vargas  Date: 10/12/2023  Medical Record: [id number]  Envelope Number: [id number]  List of Contents (List each item separately in new row):   Cell phone and     Admission:  I am responsible for any personal items that are not sent to the safe or pharmacy.  [facility] is not responsible for loss, theft or damage of any property in my possession.      Patient Signature:  ___________________________________________       Date/Time:__________________________    Staff Signature: __________________________________       Date/Time:__________________________    [facility] Staff person, if patient is unable/unwilling to sign:      __________________________________________________________       Date/Time: __________________________      **All medications are packaged by [facility] staff and securely stored on [facility]. Medications left by patients at discharge will be packaged by [facility] staff and transported by [facility] staff to inpatient pharmacy for storage.**        Discharge:  [facility] has returned all of my personal belongings:    Patient Signature: ________________________________________     Date/Time: ____________________________________    Staff Signature: ______________________________________     Date/Time:_____________________________________

## 2023-10-12 NOTE — DISCHARGE SUMMARY
Nick Vargas MRN# [id number]   Age: 58 year old YOB: 1965     Date of Admission:  10/9/2023  Date of Discharge:  10/12/2023  Admitting Physician:  Uzair Plummer MD  Discharge Physician:  Camilla Roberts MD      DISCHARGE  DX  alcohol use dx severe            Event Leading to Hospitalization:     See Admission note by admitting provider for patient encounter. for additional details.          Hospital Course:   PATIENT was admitted to [facility] attending  under DR roberts, please review the detailed admit note on 10/10/23   The patient was placed under status 15 (15 minute checks) to ensure patient safety.   MSSA protocol was initiated due to the patient's history of alcohol abuse and concern for withdrawal symptoms.  CBC, BMP and utox obtained.    All outpatient medications were continued    PATIENTdid participate in groups and was visible in the milieu.     The patient's symptoms of withdrawal improved.     Patients energy motivation , sleep appetite improved.  Pt completed detox . It was un eventful.      Discussed with patient medications for craving.  Pt is not  willing to take  antabuse/naltrexone/campral    Spoke with patient about triggers coping skills relapse prevention.    CONSULTS DONE DURING PATIENTS HOSPITALIZATION.  Patient was seen by medicine on date    This as per their medical consult   Assessment and Plan/Recommendations:      Nick Vargas is a 58 year old male with PMHx significant for alcohol use disorder but is otherwise not on any home medications.  Patient presented to [facility] ER seeking detox and hope to enter alcohol treatment.  He was admitted to station 3 a for detox.  Internal medicine was consulted for comanagement.     # Alcohol withdrawal, hx of alcohol use disorder   MSSA 6-8 this shift.  Patient denies history of alcohol withdrawal seizures.  Pt reports drinking 20+ bottles of beer daily. Patient reportedly has struggled with drinking for  over 30 years. Blood EtOH on arrival was 0.226.    - Continue MSSA   - Folvite, multi-vites, thiamine supplementation   - Further management per Psychiatry   - Seizures precautions advised     # Transaminitis 2/2 EtOH use disorder, suspect acute on chronic   , ALT 95, Alk Phos 163. Bilirubin normal. No significant abdominal pain suggestive of acute alcoholic hepatitis.    - No need to trend LFTs unless pt becomes symptomatic with severe abdominal pain, nausea and vomiting.   - Ok to give Tylenol up to 2-3 grams daily for supportive cares   - Repeat CMP in the next few weeks at PCP follow up once pt is outside of acute EtOH withdrawal.   -Continue to encourage alcohol abstinence as above     # Suspected underlying liver disease   On discussion with patient he interacts appropriately but show signs of delayed comprehension and cognition.  He also has noted thin skin, scattered purpura on upper extremities, and spider veins of lower extremities.  All this leads to suspicion for chronic liver disease in setting of 30+ years of heavy alcohol use. Slowed cognition potentially due to high doses of valium in setting of withdrawal. Also consider cognitive deficits in the setting of prolonged alcohol use. Patient reports he feels like he had an ultrasound of his liver at outside hospital within the past week but records of this are not available.  - These concerns were discussed with patient and alcohol cessation strongly advised.  - Consider right upper quadrant ultrasound as outpatient.  We will hold off at this time as it will not change acute management.  - Patient may also benefit from seeing GI as outpatient     Currently, medically stable and internal medicine will sign off. Please contact if future questions or concerns arise. Thank you for the opportunity to be a part of this patient's care.                              Labs:reviewed with patient     No results found for this or any previous visit (from the  past 48 hour(s)).      Recent Results (from the past 240 hour(s))   Alcohol breath test POCT    Collection Time: 10/09/23 10:46 AM   Result Value Ref Range    Alcohol Breath Test 0.226 (A) 0.00 - 0.01   Extra Green Top (Lithium Heparin) Tube    Collection Time: 10/09/23 11:04 AM   Result Value Ref Range    Hold Specimen JIC    Extra Purple Top Tube    Collection Time: 10/09/23 11:04 AM   Result Value Ref Range    Hold Specimen JIC    Comprehensive metabolic panel    Collection Time: 10/09/23 11:04 AM   Result Value Ref Range    Sodium 131 (L) 135 - 145 mmol/L    Potassium 4.6 3.4 - 5.3 mmol/L    Carbon Dioxide (CO2) 25 22 - 29 mmol/L    Anion Gap 15 7 - 15 mmol/L    Urea Nitrogen 6.4 6.0 - 20.0 mg/dL    Creatinine 0.55 (L) 0.67 - 1.17 mg/dL    GFR Estimate >90 >60 mL/min/1.73m2    Calcium 9.2 8.6 - 10.0 mg/dL    Chloride 91 (L) 98 - 107 mmol/L    Glucose 120 (H) 70 - 99 mg/dL    Alkaline Phosphatase 163 (H) 40 - 129 U/L     (H) 0 - 45 U/L    ALT 95 (H) 0 - 70 U/L    Protein Total 7.2 6.4 - 8.3 g/dL    Albumin 4.5 3.5 - 5.2 g/dL    Bilirubin Total 0.6 <=1.2 mg/dL   Magnesium    Collection Time: 10/09/23 11:04 AM   Result Value Ref Range    Magnesium 2.1 1.7 - 2.3 mg/dL   CBC with platelets and differential    Collection Time: 10/09/23 11:04 AM   Result Value Ref Range    WBC Count 3.4 (L) 4.0 - 11.0 10e3/uL    RBC Count 3.93 (L) 4.40 - 5.90 10e6/uL    Hemoglobin 13.3 13.3 - 17.7 g/dL    Hematocrit 37.1 (L) 40.0 - 53.0 %    MCV 94 78 - 100 fL    MCH 33.8 (H) 26.5 - 33.0 pg    MCHC 35.8 31.5 - 36.5 g/dL    RDW 13.7 10.0 - 15.0 %    Platelet Count 161 150 - 450 10e3/uL    % Neutrophils 39 %    % Lymphocytes 40 %    % Monocytes 18 %    Mids % (Monos, Eos, Basos)      % Eosinophils 1 %    % Basophils 2 %    % Immature Granulocytes 0 %    NRBCs per 100 WBC 0 <1 /100    Absolute Neutrophils 1.3 (L) 1.6 - 8.3 10e3/uL    Absolute Lymphocytes 1.4 0.8 - 5.3 10e3/uL    Absolute Monocytes 0.6 0.0 - 1.3 10e3/uL    Mids Abs  (Monos, Eos, Basos)      Absolute Eosinophils 0.0 0.0 - 0.7 10e3/uL    Absolute Basophils 0.1 0.0 - 0.2 10e3/uL    Absolute Immature Granulocytes 0.0 <=0.4 10e3/uL    Absolute NRBCs 0.0 10e3/uL   Asymptomatic COVID-19 Virus (Coronavirus) by PCR Nasopharyngeal    Collection Time: 10/09/23 11:50 AM    Specimen: Nasopharyngeal; Swab   Result Value Ref Range    SARS CoV2 PCR Negative Negative   Drug Abuse Screen Qual Urine    Collection Time: 10/09/23  1:25 PM   Result Value Ref Range    Amphetamines Urine Screen Negative Screen Negative    Barbituates Urine Screen Negative Screen Negative    Benzodiazepine Urine Screen Negative Screen Negative    Cannabinoids Urine Screen Negative Screen Negative    Cocaine Urine Screen Negative Screen Negative    Fentanyl Qual Urine Screen Negative Screen Negative    Opiates Urine Screen Negative Screen Negative    PCP Urine Screen Negative Screen Negative   GGT    Collection Time: 10/10/23  7:04 AM   Result Value Ref Range     (H) 8 - 61 U/L   Hemoglobin A1c    Collection Time: 10/10/23  7:04 AM   Result Value Ref Range    Hemoglobin A1C 5.2 <5.7 %   Lipid panel    Collection Time: 10/10/23  7:04 AM   Result Value Ref Range    Cholesterol 200 (H) <200 mg/dL    Triglycerides 41 <150 mg/dL    Direct Measure  >=40 mg/dL    LDL Cholesterol Calculated 72 <=100 mg/dL    Non HDL Cholesterol 80 <130 mg/dL   TSH with free T4 reflex and/or T3 as indicated    Collection Time: 10/10/23  7:04 AM   Result Value Ref Range    TSH 10.24 (H) 0.30 - 4.20 uIU/mL   T4 free    Collection Time: 10/10/23  7:04 AM   Result Value Ref Range    Free T4 0.88 (L) 0.90 - 1.70 ng/dL            Because this patient meets criteria for an Alcohol Use Disorder, I performed the following brief intervention on the date of this note:              1) Expressed concern that the patient is drinking at unhealthy levels known to increase their risk of alcohol related problems              2) Gave feedback linking  alcohol use and health, including personalized feedback explaining how alcohol use can interact with their medical and/or psychiatric problems, and with prescribed medications.              3) Advised patient to abstain.    PT counseled on nicotine cessation and nicotine replacement provided    Counseled the patient on the importance of having a recovery program in addition to medication to manage recovery.  Components include avoiding isolating, having willingness to change, avoiding triggers and managing cravings. Encouraged having some type of sober network and practicing honesty with trusted support person(s).     Discussed with patient many issues of addiction,triggers, relapse, and establishing a solid recovery program.    DISCHARGE MENTAL STATUS EXAMINATION:  The patient is alert, oriented x3.  Good fund of knowledge.  Good use of language.  Recent and remote memory, language, fund of knowledge are all adequate.  Euthymic mood congruent affect  Speech normal rate/rhythm linear tp no loose asso,The patient does not have any active suicidal or homicidal ideation.  Does not have any auditory or visual hallucination.  Fair insight/judgment At this time, the patient was stable to be discharged.        Pt was not determined to not be a danger to himself or others. At the current time of discharge, the patient does not meet criteria for involuntary hospitalization. On the day of discharge, the patient reports that they do not have suicidal or homicidal ideation and would never hurt themselves or others. Steps taken to minimize risk include: assessing patient s behavior and thought process daily during hospital stay, discharging patient with adequate plan for follow up for mental and physical health and discussing safety plan of returning to the hospital should the patient ever have thoughts of harming themselves or others. Therefore, based on all available evidence including the factors cited above, the patient does  "not appear to be at imminent risk for self-harm, and is appropriate for outpatient level of care.     Educated about side effects/risk vs benefits /alternative including non treatment.Pt consented to be on medication.     .Total time spent on discharge summary more than 35 min  More than  20 min  planning, coordination of care, medication reconciliation and performance of physical exam on day of discharge.Care was coordinated with unit RN and unit therapist         Review of your medicines        START taking        Dose / Directions   multivitamin w/minerals tablet  Used for: Alcohol dependence with uncomplicated withdrawal (H)      Dose: 1 tablet  Take 1 tablet by mouth daily  Quantity: 30 tablet  Refills: 0     thiamine 100 MG tablet  Commonly known as: B-1  Used for: Alcohol dependence with uncomplicated withdrawal (H)      Dose: 100 mg  Take 1 tablet (100 mg) by mouth daily  Quantity: 30 tablet  Refills: 0     traZODone 50 MG tablet  Commonly known as: DESYREL  Used for: Alcohol dependence with uncomplicated withdrawal (H)      Dose: 50 mg  Take 1 tablet (50 mg) by mouth nightly as needed for sleep (may repeat after 60 minutes)  Quantity: 30 tablet  Refills: 0               Where to get your medicines        These medications were sent to [facility] [address]  [address] [address]      Phone: [phone]   multivitamin w/minerals tablet  thiamine 100 MG tablet  traZODone 50 MG tablet          Disposition: lp     Facts about COVID19 at www.cdc.gov/COVID19 and www.[place].gov/covid19     Keeping hands clean is one of the most important steps we can take to avoid getting sick and spreading germs to others.  Please wash your hands frequently and lather with soap for at least 20 seconds!     Medical Follow-Up:pcp in 2-3 weeks        Treatment Follow-[facility]   .        \"Much or all of the text in this note was generated through the use of Dragon " "Dictate voice to text software. Errors in spelling or words which appear to be out of contact are unintentional, may be present due having escaped editing\"     "

## 2023-10-12 NOTE — PROGRESS NOTES
Progress Note    This patient had a IP Substance Use Disorder assessment on 10/10/2023 completed by NOE Ambriz.  This patient was seen for a face to face update of the IP Substance Use Disorder assessment on 10/12/2023 by NOE Barnhart.  INSIDE: The patient's IP Substance Use Disorder assessment completed on 10/10/2023 is in the patient's electronic medical record in Epic in the Chart Review section under the Notes/Trans Tab.    Alcohol/Drug use since the last CD evaluation (include date of last use):     No additional substances use since the last CD evaluation     Please note any other clinical changes since the last CD evaluation (such as medication changes, additional legal charges, detoxification admissions, overdoses, etc.)     No significant changes since the last CD evaluation       ASAM Dimensions Original scores Current Scores   I.) Intoxication and Withdrawal: 0 0   II.) Biomedical:  1 1   III.) Emotional and Behavioral:  2 2   IV.) Readiness to Change:  1 1   V.) Relapse Potential: 4 4   VI.) Recovery Environmental: 4 4     Please list clinical justifications for the above ASAM score changes since the original comprehensive assessment:     None of the ASAM scores on the six dimensions had changed since the IP Substance Use Disorder assessment was completed on 10/10/2023.       Current REBECCA: Current UA:     No REBECCA as the patient was a direct transfer from 3 A IP detoxification unit at [facility] in [place], [place].     No UA screen as the patient was a direct transfer from 3 A IP detoxification unit at [facility] in [place], [place].        PHQ-9, MARLEY-7   PHQ-9 on 10/12/2023 MARLEY-7 on 10/12/2023   The patient's PHQ-9 score was 11 out of 27, indicating moderate depression.   The patient's MARLEY-7 score was 9 out of 21, indicating mild anxiety.       Drew-Suicide Severity Rating Scale Reassessment   Have you ever wished you were dead or that you could go to sleep and not wake up?   Past Month:  No     Have you actually had any thoughts of killing yourself?  Past Month:  No     Have you been thinking about how you might do this?     Past Month:  No   Lifetime:  No   Have you had these thoughts and had some intention of acting on them?     Past Month:  No   Lifetime:  No   Have you started to work out the details of how to kill yourself?   Past Month:  No   Lifetime:  No   Do you intend to carry out this plan?   No     When you have the thoughts how long do they last?  The patient denied ever having any suicidal thoughts in life.     Are there things - anyone or anything (i.e. family, Evangel[employer], pain of death) that stopped you from wanting to die or acting on thoughts of suicide?  Does not apply       2008  [employer]  Used with permission by Neha Chambers, PhD.       Guide to C-SSRS Risk Ratings   NO IDEATION:  with no active thoughts IDEATION: with a wish to die. IDEATION: with active thoughts. Risk Ratings   If Yes No No 0 - Very Low Risk   If NA Yes No 1 - Low Risk   If NA Yes Yes 2 - Low/moderate risk   IDEATION: associated thoughts of methods without intent or plan INTENT: Intent to follow through on suicide PLAN: Plan to follow through on suicide Risk Ratings cont...   If Yes No No 3 - Moderate Risk   If Yes Yes No 4 - High Risk   If Yes Yes Yes 5 - High Risk   The patient's ADDITIONAL RISK FACTORS and lack of PROTECTIVE FACTORS may increase their overall suicide risk ratings.     Additional Risk Factors:   Significant history of untreated or poorly treated chronic pain issues    A recent death of someone close to the patient and/or unresolved grief and loss issues   Protective Factors:   Having people in his/her life that would prevent the patient from considering a suicide attempt (i.e. young children, spouse, parents, etc.)    Having pet(s) that give companionship and/or would prevent the patient from considering a suicide attempt    An absence of  "mental health issues or stable and well treated mental health issues    Having easy access to supportive family members     Risk Status   0. - Very Low Risk:  Evaluation Counselors:  Document in Epic / SBAR to counselor \"Very Low Risk\".      Treatment Counselors:  Reassess upon admission as applicable, assess weekly in progress notes under Dimension 3 and summarize in Discharge / Treatment summary under Dimension 3.     Additional information to support suicide risk rating: There was no additional information to provide at this time.      "

## 2023-10-12 NOTE — PROGRESS NOTES
Behavioral Health  Note    Behavioral Health  Spirituality Group Note    [facility]    Name:    Nick Vargas                                                               YOB: 1965    MRN:     [id number]                                                                      Age: 58 year old      Patient attended -led group, which included discussion of spirituality, coping with illness and building resilience. Today's topic was holding on/letting go.    Patient attended group for [facility] - spirituality groups are not billed.    The patient actively participated in group discussion and patient demonstrated an appreciation of topic's application for their personal circumstances.    Nick identified a Maple Tree and [place] as favorite trees.    Nick shared that he'd like to hold on to the following in his life: family, friends, chhaya, and love.      Lynn Zayas MA  Associate   Pager: [phone]

## 2023-10-12 NOTE — PLAN OF CARE
Goal Outcome Evaluation:    Plan of Care Reviewed With: patient      Outcome: Pt is discharged.    Pt given copy of their discharge instructions and medication administration instructions. All discharge plans and labs were discussed with patient. Pt reports no questions at this time regarding discharge plans, labs or medications. Pt denies any suicidal ideation, plans or intent at this time. Patient discharge assisted via ROBERT HERNANDEZ directly to [facility]. Patient plan is to begin treatment today at [facility]. Updated [facility] RN that pt will need to set up a follow up appointment still and will need assist to set that up. Patient is discharged at this time.

## 2023-10-12 NOTE — PROGRESS NOTES
[facility] Assessment      Vital signs:     There were no vitals taken for this visit.      Transfer from     Counselor: Group B  Drug of Choice: Alcohol   Last use: 10/9/23  Home clinic/MD: Pt has not seen a doctor in years until his recent fall. Pt saw Steve tirado [facility] on [address], would like to go there for primary care    Psychiatrist/therapist: None    Medical history/current conditions: History of scoliosis      H&P Screen:  H&P within the last 90 days: Yes.  Date: 10/09/23 Location: ED to Hosp Admission      Mental Health diagnosis: None  Medication compliant?: Yes   Recent sucidal thoughts? None       Current thought of self-harm? None        Pain assessment:   Pt. Experiencing pain at this time?  No      LP Falls assessment    Has patient had a fall(s) in the last 6 months?  Yes, one recent fall in the back yard   If yes, what were the circumstances surrounding the fall(s)? Pt had been drinking, no falls sober    Does patient have gait dysfunctions? (limping, dragging of toes, shuffling feet, unsteadiness, difficulty standing /walking)   Slow Gait   Does patient appear to have deconditioning/muscle loss/malnutrition/fatigue? (due to inactivity, bedrest (long hospitalization), medical condition(s) Yes  Does patient experience confusion, dizziness or vertigo? No  Is patient visually impaired? No   Does patient have any adaptive equipment (hearing aids, wheelchair, walker, prosthesis, crutches, cane, etc..) No  Is patient taking 2 or more of the following medications?  anticonvulsants, anti-hypertensives, diuretics, laxatives, sedatives, and psychotropics (single-select) No  Is patient taking medication (s) that would cause urinary or bowel urgency (ex: lactulose/Furosemide)? No  Does patient have a medical condition (ex: Diabetes, Liver Disease, Respiratory Diseases, Chronic Pain, Heart Disease, Neuropathy, Seizure like Disorder, etc...) that could affect balance/gait or  risk for falls? No    If yes to any of the above educate patient on fall prevention while at [facility].           Floors clutter/obstacle free           Proper footwear/[place] shoes          Adjust walking speed accordingly          Recommend caution going on longer walks. Try shorter walks and see how you do first.  Use elevators when appropriate.          Notify staff if you are experiencing fatigue, weakness, drowsiness/ dizziness or have had a fall.           Use adaptive equipment as recommended          Wheelchairs must be managed and propelled independently without staff assistance           Expectation of complete independence with all cares and compliance.  Report to staff if having difficulty     LP patient who poses a potential falls risk will be advised of the following:  Please follow the recommendations as listed above or it may affect your treatment plan.  Your treatment team would have to evaluate if this level of care is appropriate for you.    Patient acknowledges and verbalizes understanding of the above criteria? Yes  Support staff / counselors notified of pt Fall Screen and plan of prevention. LPRN to re-assess as appropriate. White board and SBAR updated Yes    LP Community Medical Screen for COVID-19    ______________________________________________________________________________________________________________________  Do you have any of the following NEW or worsening symptoms NOT attributed to pre-existing conditions?    No    Fever of 100.0  F (37.8 C) or over  Chills  Cough  Shortness of Breath  Loss of taste or smell  Generalized body aches  Persistent headache  Sore throat (or trouble eating or drinking in young children?)  Nausea, Vomiting, or diarrhea (loose stools)    Did you test positive for COVID-19 in the last 10 days or are you waiting on the test results due to an exposure or symptoms?  No    Has anyone told you to self-quarantine due to exposure to someone with COVID-19?   No    If pt responds   YES to any of the symptoms or  Positive COVID-19 result in the last 10 days with or without symptoms or YES to symptoms with pending results ptwill need to leave unit immediately and can return in 11 days from discharge date.    ______________________________________________________________________________________________________________________  If you are admitting directly from the community you will be required to stay in your room until COVID lab results confirm negative. If COVID results are positive, You will have to exit the LP program, quarantine as recommended per CDC and then may return for CD treatment after symptoms have resolved.  What is your exit plan should you be positive for COVID today or anytime during your stay? Return home     COVID-19 Test completed by LPRN ? No    COVID-19 - Pt informed of the following while at LP:    1) Practice good hand washing hygiene and avoid touching face    2) If pt has any of the symptoms below, notify staff immediately.    Fever   Cough   Shortness of breath or difficulty breathing   Chills   Repeated shaking with chills  Muscle pain     3) COVID-19 testing may be initiated more than once during your stay.  If COVID results are at anytime positive, the pt will follow exit plan as listed above,  quarantine as recommended per CDC and then may return for CD treatment after symptoms have resolved.     RN Assessment of Patient's Ability to Safely Manage and Self-Administer Respiratory Treatments      Including knowledge and understanding of the importance of:    Does pt have any of the following Respiratory Illness/disorders?   Asthma, COPD, RAD, Bronchitis, Emphysema, Cystic fibrosis, EVELYNE No    Which Acute or Chronic Respiratory Illness do you have which requires intervention? N/A           Integrative Therapies: Essential Oils    Patient requesting essential oil inhaler to manage (Mood/Mental Health/Physical/Spiritual symptoms).     Discussed  appropriate use of essential oil inhalers and instructed patient not to leave labeled product out on unit.     Patient was screened for kidney disease, asthma/reactive airway disease and rashes and wounds or 1st trimester of pregnancy    List Essential Oils requested by pt Lindsay Smyth Pepperminlauren     Patient verbalized and demonstrated understanding of how to use essential oil inhaler correctly and will notify LP RN with any concerns or side effects. Patient agrees not to share their essential oil inhaler with other clients.  Continue to support the patient in safely utilizing integrative therapies as able to manage symptoms during treatment.     Patient tobacco use:    Do you use tobacco? No       Nutritional Assessment:    Have you ever purged, binged or restricted yourself as a way to control your weight?   No     Are you on a special diet?   No     Do you have any concerns regarding your nutritional status?   Yes, explain: Pt has not been eating when he drinks      Have you had any appetite changes in the last 3 months?   Yes, explain, loss of appetite    Have you had weight loss or weight gain of more than 10 lbs in the last 3 months?   If patient gained or lost more than 10 lbs, then refer to program RN / attending Physician for assessment.   Yes, explain: Lost 40 lbs   Was the patient informed of BMI?    Normal, No Intervention   Yes   Have you engaged in any risk-taking behavior that would put you at risk for exposure to blood-borne or sexually transmitted diseases?   No   Do you have any dental problems?   No           LPRN reviewed with pt the following information:  Yes  Pt informed if leaving AMA, they will be directed to take medications with them.  Should pt's choose to leave medications at LP, ALL medications will be packaged and delivered to inpatient pharmacy for temporary storage.  Inpt pharmacy will follow protocol to reach out to pt.  If pharmacy unable to reach pt and/or pt does not retrieve  medications, they will be destroyed per inpt pharmacy protocol.   In regards to 'medical concerns/medication refill expectations' while at LP:  LP has no rounding/managing provider to assess medical issues or to refill your medications.  As part of the treatment experience you will be meeting with our LP Medical Director.  LPRN gave pt admission appt slip and LP SBAR updated Yes  Please make virtual/phone appt/s with your community provider/s and notify LPRN of date and time  You may not leave LP to attend any medical appt's.   You are responsible for having a plan to refill medications if necessary.  Please allow time to complete this.    Pt verbalizes understanding of the above criteria Yes    [facility]  Nurse Liaison / CD [facility]  O: [phone]  Fax:[phone]  [facility] [id number]  M-F: 7AM to 5:30PM   Sat-Sun: 7AM to 3PM  After hours: [phone]     Nursing Assessment Summary:  See Above    On-going nursing intervention required?   No    Acute care visit recommended: no

## 2023-10-12 NOTE — PLAN OF CARE
Problem: Alcohol Withdrawal  Goal: Alcohol Withdrawal Symptom Control  Outcome: Progressing   Goal Outcome Evaluation:  Problem: Behavioral Health Plan of Care  Goal: Optimal Comfort and Wellbeing  Outcome: Ongoing, Progressing    Patient observed in bed throughout the night and appeared to be sleeping comfortably overnight; no behavioral concerns noted;  MSSA  score at 0000 was 3.   /75 (BP Location: Left arm, Patient Position: Sitting, Cuff Size: Adult Regular)   Pulse 89   Temp 98.8  F (37.1  C) (Oral)   Resp 16   Ht 1.829 m (6')   Wt 74.4 kg (164 lb)   SpO2 97%   BMI 22.24 kg/m    No PRN medications given this shift.   No new medical concerns noted.   Sleep hours: 5.75

## 2023-10-13 ENCOUNTER — HOSPITAL ENCOUNTER (OUTPATIENT)
Dept: BEHAVIORAL HEALTH | Facility: CLINIC | Age: 58
Discharge: HOME | End: 2023-10-13
Attending: FAMILY MEDICINE
Payer: COMMERCIAL

## 2023-10-13 PROCEDURE — 1002N00001 HC LODGING PLUS FACILITY CHARGE ADULT

## 2023-10-13 PROCEDURE — H2035 A/D TX PROGRAM, PER HOUR: HCPCS | Mod: HQ

## 2023-10-13 PROCEDURE — H2035 A/D TX PROGRAM, PER HOUR: HCPCS

## 2023-10-13 NOTE — PROGRESS NOTES
Acknowledgement of Current Treatment Plan - Initial Treatment Plan     INITIAL TREATMENT PLAN:     1. I have participated in creating my treatment plan with my therapist / counselor on10/13/2023.     I agree with the plan as it is written in the electronic health record.    Name Signature/Date   Nick Vargas    Name of Therapist / Counselor Signature/Date   NOE Nash      2. I have completed and reviewed my Safety Plan with my counselor and signed this on 10/13/2023. I have been given the hard copy of this plan.    Patient signature/date:      ________________________________________________________________    3. Last Use Date: _10/9/23_________    Patient signature/date:     ________________________________________________________________

## 2023-10-13 NOTE — GROUP NOTE
Group Therapy Documentation    PATIENT'S NAME: Nick Vargas  MRN:   [id number]  :   1965  ACCT. NUMBER: [id number]  DATE OF SERVICE: 10/13/23  START TIME:  9:00 AM  END TIME: 11:00 AM  FACILITATOR(S): Jose Carroll LADC  TOPIC: BEH Group Therapy  Number of patients attending the group: 8  Group Length:  2 Hours    Group Therapy Type: Recovery strategies    Summary of Group / Topics Discussed:    Recovery Principles, Sober coping skills, Balanced lifestyle, Disease of addiction, Emotions/expression, and Relapse prevention      Group Attendance:  Attended group session    Patient's response to the group topic/interactions:  cooperative with task, discussed personal experience with topic, expressed readiness to alter behaviors, and listened actively    Patient appeared to be Attentive and Engaged.        Client specific details:  Nick gave appropriate feedback. .

## 2023-10-13 NOTE — PROGRESS NOTES
Comprehensive Assessment Summary     Based on client interview, review of previous assessments and   comprehensive assessment interview the following diagnosis and recommendations are:     Patient: Nick Vargas  MRN; [id number]   : 1965  Age: 58 year old Sex: male       Client meets criteria for:  303.90 Alcohol Dependence    Dimension One: Acute Intoxication/Withdrawal Potential     Ratin     (Consider the client's ability to cope with withdrawal symptoms and current state of intoxication)   Pt.transferred from detox. Pt.reports last use as 10/9/23    Dimension Two: Biomedical Condition and Complications    Ratin   (Consider the degree to which any physical disorder would interfere with treatment for substance abuse, and the client's ability to tolerate any related discomfort; determine the impact of continued chemical use on the unborn child if the client is pregnant)   pt.reports no medical issues that would interfere  with his completing treatment.     Dimension Three: Emotional/Behavioral/Cognitive Conditions & Complications  Ratin  (Determine the degree to which any condition or complications are likely to interfere with treatment for substance abuse or with functioning in significant life areas and the likelihood of risk of harm to self or others)   Pt.participated in suicide risk screening as part of his CD assessment and was given a no/low risk factor. Pt.completed PHQ-9 upon admission and scored 11/27 indicating moderate depression. His MARLEY-7 score was 23 indicating mild anxiety. Pt.denied having a mental health Dx.Pt.may have grief and loss issues over the deaths of his mother and father. Pt.lacks sober coping skills and impulse control.     Dimension Four: Treatment Acceptance/Resistance     Ratin    (Consider the amount of support and encouragement necessary to keep the client involved in treatment)   This is the pt's first treatment. He lacks insight and acceptance of  the first step. He lacks internal motivation to change his life style to maintain sobriety.      Dimension Five: Continued Use/Relaspe Prevention     Ratin  (Consider the degree to which the client's recognizes relapse issues and has the skills to prevent relapse of either substance use or mental health problems)   Pt.lacks awareness of his relapse process. Pt.lacks insight into his relapse warning signs and triggers in the areas of people,places,activities and feelings. Pt.lacks lonr term sober maintenance skills. Pt.lacks relapse prevention plan.     Dimension Six: Recovery Environment     Ratin   (Consider the degree to which key areas of the client's life are supportive of or antagonistic to treatment participation and recovery)   Pt.is unemployed. He lives off his pension. Pt.reports he lives alone and drinks alone. He lacks a sober support system. He does not have a sponsor. He reports he has no children and has never been . .     I have reviewed the information on the assessment, psychosocial and medical history and checklist:        the following changes have been made  Dim 2 was chaned from a Risk Factor of 1 to 0. Dim 3 was changed from 2 to 1 since pt reports no mental health Dx.

## 2023-10-13 NOTE — GROUP NOTE
Group Therapy Documentation    PATIENT'S NAME: Nick Vargas  MRN:   [id number]  :   1965  ACCT. NUMBER: [id number]  DATE OF SERVICE: 10/13/23  START TIME: 12:30 PM  END TIME:  2:30 PM  FACILITATOR(S): Justino Romero LADC  TOPIC: BEH Group Therapy  Number of patients attending the group:  8  Group Length:  2 Hours    Group Therapy Type: Recovery strategies    Summary of Group / Topics Discussed:    Recovery Principles      Group Attendance:  Attended group session    Patient's response to the group topic/interactions:  cooperative with task    Patient appeared to be Attentive and Engaged.        Client specific details:  Nick participated in afternoon group which consisted of a sober activity and a discussion.

## 2023-10-14 ENCOUNTER — HOSPITAL ENCOUNTER (OUTPATIENT)
Dept: BEHAVIORAL HEALTH | Facility: CLINIC | Age: 58
Discharge: HOME | End: 2023-10-14
Attending: FAMILY MEDICINE
Payer: COMMERCIAL

## 2023-10-14 PROCEDURE — H2035 A/D TX PROGRAM, PER HOUR: HCPCS | Mod: HQ

## 2023-10-14 PROCEDURE — 1002N00001 HC LODGING PLUS FACILITY CHARGE ADULT

## 2023-10-14 NOTE — GROUP NOTE
Group Therapy Documentation    PATIENT'S NAME: Nick Vargas  MRN:   [id number]  :   1965  ACCT. NUMBER: [id number]  DATE OF SERVICE: 10/14/23  START TIME: 12:30 PM  END TIME:  2:30 PM  FACILITATOR(S): Haley Villarreal; Uzair Catalan, NOE  TOPIC: BEH Group Therapy  Number of patients attending the group:  26    Group Length:  2 Hours    Group Therapy Type: Recovery strategies and Health and wellbeing     Summary of Group / Topics Discussed:    Recovery Principles, Sober coping skills, Balanced lifestyle, Mindfulness/Relaxation, Disease of addiction, Emotions/expression, Relapse prevention, and Self-care activities      Group Attendance:  Attended group session    Patient's response to the group topic/interactions:  confronted peers appropriately, cooperative with task, discussed personal experience with topic, expressed readiness to alter behaviors, expressed understanding of topic, gave appropriate feedback to peers, listened actively, and offered helpful suggestions to peers    Patient appeared to be Actively participating, Attentive, and Engaged.        Client specific details:  Pt was engaged in topic of self care and participated in group activities. Pt offered support and feedback to peers. Pt was also open to feedback and support from group members. Pt attended the entirety of group.    Haley Villarreal, Psychotherapist Trainee, [facility]   10/14/23

## 2023-10-14 NOTE — GROUP NOTE
Group Therapy Documentation    PATIENT'S NAME: Nick Vargas  MRN:   [id number]  :   1965  ACCT. NUMBER: [id number]  DATE OF SERVICE: 10/14/23  START TIME:  9:00 AM  END TIME: 11:00 AM  FACILITATOR(S): Uzair Catalan LADC  TOPIC: BEH Group Therapy  Number of patients attending the group:  26  Group Length:  2 Hours    Group Therapy Type: Recovery strategies, Emotion processing, and Daily living/independence skills    Summary of Group / Topics Discussed:    Disease of addiction and Relapse prevention    Group began an outside speaker, followed by a lecture provided by staff. Feedback was provided throughout group session.       Group Attendance:  Attended group session    Patient's response to the group topic/interactions:  cooperative with task    Patient appeared to be Actively participating, Attentive, and Engaged.        Client specific details:  Patient actively engaged in group discussion.

## 2023-10-15 ENCOUNTER — HOSPITAL ENCOUNTER (OUTPATIENT)
Dept: BEHAVIORAL HEALTH | Facility: CLINIC | Age: 58
Discharge: HOME | End: 2023-10-15
Attending: FAMILY MEDICINE
Payer: COMMERCIAL

## 2023-10-15 PROCEDURE — H2035 A/D TX PROGRAM, PER HOUR: HCPCS | Mod: HQ

## 2023-10-15 PROCEDURE — 1002N00001 HC LODGING PLUS FACILITY CHARGE ADULT

## 2023-10-15 NOTE — GROUP NOTE
Group Therapy Documentation    PATIENT'S NAME: Nick Vargas  MRN:   [id number]  :   1965  ACCT. NUMBER: [id number]  DATE OF SERVICE: 10/15/23  START TIME: 12:30 PM  END TIME:  1:30 PM  FACILITATOR(S): Haley Villarreal; Uzair Catalan LADC  TOPIC: BEH Group Therapy  Number of patients attending the group:  25  Group Length:  2 Hours    Group Therapy Type: Recovery strategies, Emotion processing, Daily living/independence skills, and Health and wellbeing     Summary of Group / Topics Discussed:    Relationship/socialization      Group lecture regarding conflict resolution and relationships was led by counseling staff. Feedback was provided by participants throughout group session.       Group Attendance:  Attended group session    Patient's response to the group topic/interactions:  cooperative with task    Patient appeared to be Actively participating, Attentive, and Engaged.        Client specific details: Patient actively engaged in group session.

## 2023-10-15 NOTE — GROUP NOTE
Group Therapy Documentation    PATIENT'S NAME: Nick Vargas  MRN:   [id number]  :   1965  ACCT. NUMBER: [id number]  DATE OF SERVICE: 10/15/23  START TIME:  8:45 AM  END TIME: 10:45 AM  FACILITATOR(S): Haley Villarreal; Uzair Catalan LADC  TOPIC: BEH Group Therapy  Number of patients attending the group:  26  Group Length:  2 Hours    Group Therapy Type: Recovery strategies and Health and wellbeing     Summary of Group / Topics Discussed:    Recovery Principles, Relationship/socialization, Balanced lifestyle, Co-occurring illnesses symptom management, Disease of addiction, Emotions/expression, Medication management, and Self-care activities    RN presented on health and wellbeing as it relates to the disease of addiction, co-occurring illnesses, and recovery.      Group Attendance:  Attended group session    Patient's response to the group topic/interactions:  cooperative with task, expressed understanding of topic, gave appropriate feedback to peers, and listened actively    Patient appeared to be Actively participating, Attentive, and Engaged.        Client specific details:  Pt appeared to be engaged throughout the group session that was facilitated by above providers and RN. Pt asked appropriate questions throughout the session. Pt verbalized interest and how it relates to previous life experiences.

## 2023-10-16 ENCOUNTER — HOSPITAL ENCOUNTER (OUTPATIENT)
Dept: BEHAVIORAL HEALTH | Facility: CLINIC | Age: 58
Discharge: HOME | End: 2023-10-16
Attending: FAMILY MEDICINE
Payer: COMMERCIAL

## 2023-10-16 PROCEDURE — 1002N00001 HC LODGING PLUS FACILITY CHARGE ADULT

## 2023-10-16 PROCEDURE — H2035 A/D TX PROGRAM, PER HOUR: HCPCS | Mod: HQ

## 2023-10-16 NOTE — GROUP NOTE
Group Therapy Documentation    PATIENT'S NAME: Nick Vargas  MRN:   [id number]  :   1965  ACCT. NUMBER: [id number]  DATE OF SERVICE: 10/16/23  START TIME: 12:30 PM  END TIME:  2:30 PM  FACILITATOR(S): Eliza Freire LADC  TOPIC: BEH Group Therapy  Number of patients attending the group:  8  Group Length:  2 Hours    Group Therapy Type: Emotion processing    Summary of Group / Topics Discussed:    Sober coping skills, Disease of addiction, and Relapse prevention      Group Attendance:  Attended group session    Patient's response to the group topic/interactions:  cooperative with task    Patient appeared to be Actively participating, Attentive, and Engaged.        Client specific details: Nick was an active participant in afternoon group therapy session. He offered supportive feedback to his peer who shared his Drug Use History assignment.

## 2023-10-16 NOTE — GROUP NOTE
"Group Therapy Documentation    PATIENT'S NAME: Nick Vargas  MRN:   [id number]  :   1965  ACCT. NUMBER: [id number]  DATE OF SERVICE: 10/16/23  START TIME:  9:00 AM  END TIME: 11:00 AM  FACILITATOR(S): Marya Dixon LADC  TOPIC: BEH Group Therapy  Number of patients attending the group:  8  Group Length:  2 Hours    Group Therapy Type: Recovery strategies    Summary of Group / Topics Discussed:    Recovery Principles, Sober coping skills, and Disease of addiction    Group Attendance:  Attended group session    Patient's response to the group topic/interactions:  cooperative with task    Patient appeared to be Actively participating, Attentive, and Engaged.        Client specific details: Pt reported feeling hopeful and peaceful, and offered his peers supportive feedback on their \"First Step\" and \"Feelings and Defenses\" assignments.  "

## 2023-10-17 ENCOUNTER — HOSPITAL ENCOUNTER (OUTPATIENT)
Dept: BEHAVIORAL HEALTH | Facility: CLINIC | Age: 58
Discharge: HOME | End: 2023-10-17
Attending: FAMILY MEDICINE
Payer: COMMERCIAL

## 2023-10-17 PROCEDURE — H2035 A/D TX PROGRAM, PER HOUR: HCPCS | Mod: HQ

## 2023-10-17 PROCEDURE — 1002N00001 HC LODGING PLUS FACILITY CHARGE ADULT

## 2023-10-17 NOTE — GROUP NOTE
Group Therapy Documentation    PATIENT'S NAME: Nick Vargas  MRN:   [id number]  :   1965  ACCT. NUMBER: [id number]  DATE OF SERVICE: 10/17/23  START TIME:  3:00 PM  END TIME:  4:00 PM  FACILITATOR(S): Marya Dixon LADC; Fabio Ngo LADC; Larry Buitrago LADC  TOPIC: BEH Group Therapy  Number of patients attending the group:  22  Group Length:  1 Hours    Group Therapy Type: Recovery strategies    Summary of Group / Topics Discussed:    Recovery Principles and Relapse prevention    Group Attendance:  Attended group session    Patient's response to the group topic/interactions:  cooperative with task    Patient appeared to be Attentive.        Client specific details: Pt listened respectfully to PRS Don's presentation on recovery community resources and asked appropriate questions.

## 2023-10-17 NOTE — GROUP NOTE
"Group Therapy Documentation    PATIENT'S NAME: Nick Vargas  MRN:   [id number]  :   1965  ACCT. NUMBER: [id number]  DATE OF SERVICE: 10/17/23  START TIME:  9:00 AM  END TIME: 11:00 AM  FACILITATOR(S): Jose Carroll LADC  TOPIC: BEH Group Therapy  Number of patients attending the group:  9  Group Length:  2 Hours    Group Therapy Type: Recovery strategies and Emotion processing    Summary of Group / Topics Discussed:    Recovery Principles, Sober coping skills, Balanced lifestyle, Disease of addiction, Emotions/expression, and Relapse prevention      Group Attendance:  Attended group session    Patient's response to the group topic/interactions:  cooperative with task, discussed personal experience with topic, expressed readiness to alter behaviors, expressed understanding of topic, gave appropriate feedback to peers, and listened actively    Patient appeared to be Actively participating, Attentive, and Engaged.        Client specific details:  Nick gave appropriate feedback. .He presented his \"First Step\" assignment.    "

## 2023-10-17 NOTE — GROUP NOTE
Psychoeducation Group Documentation    PATIENT'S NAME: Nick Vargas  MRN:   [id number]  :   1965  ACCT. NUMBER: [id number]  DATE OF SERVICE: 10/17/23  START TIME: 12:30 PM  END TIME:  2:30 PM  FACILITATOR(S): Jose Carroll LADC; Justino Romero LADC  TOPIC: BEH Pyschoeducation  Number of patients attending the group:  9  Group Length:  2 Hours    Skills Group Therapy Type: Recovery skills    Summary of Group / Topics Discussed:    Relapse prevention skills        Group Attendance:  Attended group session    Patient's response to the group topic/interactions:  cooperative with task and listened actively    Patient appeared to be Attentive and Engaged.         Client specific details:  Nick gave appropriate feedback. .

## 2023-10-18 ENCOUNTER — HOSPITAL ENCOUNTER (OUTPATIENT)
Dept: BEHAVIORAL HEALTH | Facility: CLINIC | Age: 58
Discharge: HOME | End: 2023-10-18
Attending: FAMILY MEDICINE
Payer: COMMERCIAL

## 2023-10-18 PROCEDURE — H2035 A/D TX PROGRAM, PER HOUR: HCPCS | Mod: HQ

## 2023-10-18 PROCEDURE — 1002N00001 HC LODGING PLUS FACILITY CHARGE ADULT

## 2023-10-18 ASSESSMENT — ANXIETY QUESTIONNAIRES
1. FEELING NERVOUS, ANXIOUS, OR ON EDGE: NOT AT ALL
2. NOT BEING ABLE TO STOP OR CONTROL WORRYING: NOT AT ALL

## 2023-10-18 NOTE — GROUP NOTE
Group Therapy Documentation    PATIENT'S NAME: Nick Vargas  MRN:   [id number]  :   1965  ACCT. NUMBER: [id number]  DATE OF SERVICE: 10/18/23  START TIME:  9:40 AM  END TIME: 11:30 AM  FACILITATOR(S): Jose Carroll LADC  TOPIC: BEH Group Therapy  Number of patients attending the group:  11  Group Length:  2 Hours    Group Therapy Type: Recovery strategies and Emotion processing    Summary of Group / Topics Discussed:    Recovery Principles, Balanced lifestyle, Disease of addiction, and Relapse prevention      Group Attendance:  Attended group session    Patient's response to the group topic/interactions:  cooperative with task, expressed readiness to alter behaviors, expressed understanding of topic, gave appropriate feedback to peers, and listened actively    Patient appeared to be Actively participating, Attentive, and Engaged.        Client specific details:  Nick gave appropriate feedback. .

## 2023-10-18 NOTE — PROGRESS NOTES
"[facility] Weekly Treatment Plan Review      ATTENDANCE for the following date span: 10/12/2023 to 10/18/2023     Patient did not have any absences during this time period (list absence dates and reason for absence).      Weekly Treatment Plan Review     Treatment Plan initiated on: 10/13/2023.     Dimension1: Acute Intoxication/Withdrawal Potential -   Previous Dimension Ratin  Current Dimension Ratin  Date of Last Use: 10/9/23   Any reports of withdrawal symptoms - No, pt reports \"This is the best he felt in Months\".       Dimension 2: Biomedical Conditions & Complications -   Previous Dimension Ratin  Current Dimension Ratin  Medical Concerns: Pt denies  Current Medications & Medication Changes:  Current Outpatient Medications   Medication    acetaminophen (TYLENOL) 325 MG tablet    alum & mag hydroxide-simethicone (MAALOX) 200-200-20 MG/5ML SUSP suspension    benzocaine-menthol (CEPACOL) 15-3.6 MG lozenge    fish oil-omega-3 fatty acids 1000 MG capsule    guaiFENesin-dextromethorphan (ROBITUSSIN DM) 100-10 MG/5ML syrup    ibuprofen (ADVIL/MOTRIN) 200 MG tablet    loratadine (CLARITIN) 10 MG tablet    melatonin 3 MG tablet    multivitamin w/minerals (THERA-VIT-M) tablet    senna-docusate (SENOKOT-S/PERICOLACE) 8.6-50 MG tablet    thiamine (B-1) 100 MG tablet    traZODone (DESYREL) 50 MG tablet     No current facility-administered medications for this encounter.     Facility-Administered Medications Ordered in Other Encounters   Medication    Self Administer Medications: Behavioral Services     Medication Prescriber: Pt did not report PCP at this time.   Taking meds as prescribed? Yes  Medication side effects or concerns: None reported  Outside medical appointments this week (list provider and reason for visit):    None reported    Narrative: Pt seems fully functioning and seeks medical attention as needed. Pt will continue to be monitored by RN. Pt reports no medical concerns/issues, or " "complications that would interfere with treatment programming at this time.       Dimension 3: Emotional/Behavioral Conditions & Complications -   Previous Dimension Ratin  Current Dimension Ratin  PHQ2:       10/18/2023    10:00 AM   PHQ-2 (  [employer])   Q1: Little interest or pleasure in doing things 0   Q2: Feeling down, depressed or hopeless 0   PHQ-2 Score 0      GAD2:       10/18/2023    10:00 AM   MARLEY-2   Feeling nervous, anxious, or on edge 0   Not being able to stop or control worrying 0   MARLEY-2 Total Score 0     Mental health diagnosis:   Date of last SIB: None reported  Date of  last SI:  None reported  Date of last HI: None reported  Behavioral Targets: Follow recommendations of medical provider. Report any alcohol use to counselor and any increase in withdrawal symptoms to nurse. Stabilize and maintain mental health.   Risk factors: Pt.denied having a mental health diagnose.Pt.may have grief and loss issues over the deaths of his mother and father. Pt.lacks sober coping skills and impulse control.    Protective factors:  spirituality, regular sleep, regular physical activity, abstinence from substances, agreement to use safety plan, and structured day  Current MH Assignments:   \"Grief and Loss\"  \"Mens issues in Recovery\"     Narrative: Current Mental Health symptoms include: Pt denied any diagnoses at this time. . See below for suicide risk assessment. Pt does not endorse thoughts of self-harm or suicide ideation at this time. Pt.completed PHQ-9 upon admission and scored 11/27 indicating moderate depression, and his MARLEY-7 score was 9/21/23 indicating mild anxiety.  He reports that her stress level has decreased; significantly changed this week; coping skills to manage stress used were utilizing the work-out room/gym. Pt.lacks sober coping skills and impulse control.      [place] Suicide Severity Rating Scale (Short Version)      10/9/2023    10:38 AM 10/9/2023     6:00 PM 10/12/2023     " "3:00 PM   [place] Suicide Severity Rating (Short Version)   Over the past 2 weeks have you felt down, depressed, or hopeless? yes     Over the past 2 weeks have you had thoughts of killing yourself? no     Have you ever attempted to kill yourself? no     Q1 Wished to be Dead (Past Month)  no no   Q2 Suicidal Thoughts (Past Month)  no no   Q3 Suicidal Thought Method  no no   Q4 Suicidal Intent without Specific Plan  no no   Q5 Suicide Intent with Specific Plan  no no   Q6 Suicide Behavior (Lifetime)  no no   Level of Risk per Screen  low risk low risk         Dimension 4: Treatment Acceptance / Resistance -   Previous Dimension Ratin  Current Dimension Ratin  HERLINDA Diagnosis: 303.90 Alcohol Dependence    Commitment to tx process/Stage of change-Pt  appears to be in the contemplative stage of change at this time.  HERLINDA assignments -     \"1st Step\" assignment  \"Drug Use History\"       Narrative - Pt reports his  motivation this week is \"Self\". Pt verbally reports being motivated for long-term recovery. Pt reports \"to get back home and care for his dog\" as his primary motivation to stay sober and in treatment this week. Pt' group attendance and participation have been positive overall. Pt reports a 10/10 when asked how motivated he is to get sober    Dimension 5: Relapse / Continued Problem Potential -   Previous Dimension Ratin  Current Dimension Ratin  Relapses this week - None  Urges to use -  Pt denies any cravings or triggers. Pt reports he don't won't to die.   UA results - See chart     Narrative- Pt reports no cravings this past week. Pt reports craving 0/10, ten being high. He remains a high risk for relapse at this time.   Pt reported not experiencing any triggers. Pt participated in the spirituality group, facilitated by Alyce Brown and  counseling staff was present during group. Pt participated in weekend workshop on relapse prevention and completed all activities.     Dimension 6: " Recovery Environment -   Previous Dimension Ratin  Current Dimension Ratin  Family Involvement - Pt denied  Family supportive of treatment?  No, pt denied, but he stated friends for support.   Recreational activities - Pt has been participating in offered program activities and leisure time with peers. Pt enjoys working out.   Community support group attendance - Pt is attending nightly sober support meetings/lectures.    Narrative - Pt is spending free time with same-gender peers and attending at least 3 virtual 12-step meetings weekly while in [facility]. She participated in weekend workshop on relationships and completed all activities.    Progress made on transition planning goals: Initial weekly, pt will be involved in treatment setting.     Justification for Continued Treatment at this Level of Care: Risk ratings indicate continued need for this level of care. Pt has been unable to maintain abstinence from alcohol while at the outpatient level of care, lacks long-term sober maintenance skills, lacks sober coping skills and has medical issues which are exacerbated by substance abuse. Pt.reports he lives alone and drinks alone. He lacks a sober support system. He does not have a sponsor   Treatment coordination activities this week:  None at this time.   Need for peer recovery support referral? No    Discharge Planning:  Target Discharge Date/Timeframe:  2023   Med Mgmt Provider/Appt: DAISY   Ind therapy Provider/Appt: DAISY   Family therapy Provider/Appt: DAISY    Other referrals:  TBD      Has vulnerable adult status changed? N/A    Interdisciplinary Clinical Supervision including: LADC and RN    Are Treatment Plan goals/objectives effective? Yes  *If no, list changes to treatment plan:    Are the current goals meeting client's needs? Yes  *If no, list the changes to treatment plan.    Patient Input / Response: Pt contributed to treatment plan review.    *Client agrees with any changes to the treatment plan:  Yes  *Client received copy of changes: N/A  *Client is aware of right to access a treatment plan review: Yes    Rad [id number]

## 2023-10-18 NOTE — GROUP NOTE
Psychoeducation Group Documentation    PATIENT'S NAME: Nick Vargas  MRN:   [id number]  :   1965  ACCT. NUMBER: [id number]  DATE OF SERVICE: 10/18/23  START TIME:  8:30 AM  END TIME:  9:30 AM  FACILITATOR(S): Jose Carroll LADC; Uzair Catalan LADC  TOPIC: BEH Pyschoeducation  Number of patients attending the group:  24  Group Length:  1.5 Hours    Skills Group Therapy Type: Healthy behaviors development    Summary of Group / Topics Discussed:    Symptom management skills        Group Attendance:  Attended group session    Patient's response to the group topic/interactions:  cooperative with task and listened actively    Patient appeared to be Attentive.         Client specific details:  Nick gave appropriate feedback. .

## 2023-10-18 NOTE — GROUP NOTE
Group Therapy Documentation    PATIENT'S NAME: Nick Vargas  MRN:   [id number]  :   1965  ACCT. NUMBER: [id number]  DATE OF SERVICE: 10/18/23  START TIME: 12:30 PM  END TIME:  2:30 PM  FACILITATOR(S): Justino Romero LADC  TOPIC: BEH Group Therapy  Number of patients attending the group:  10  Group Length:  2 Hours    Group Therapy Type: Recovery strategies    Summary of Group / Topics Discussed:    Recovery Principles, Mindfulness/Relaxation, Coping/DBT informed care, Trauma informed care, Disease of addiction, and Emotions/expression      Group Attendance:  Attended group session    Patient's response to the group topic/interactions:  cooperative with task    Patient appeared to be Attentive and Engaged.        Client specific details:  Nick participated in afternoon group. He listened to and gave positive feedback on his peer's assignment. For the second half of group he took part in a Mindfulness Activity.

## 2023-10-19 ENCOUNTER — HOSPITAL ENCOUNTER (OUTPATIENT)
Dept: BEHAVIORAL HEALTH | Facility: CLINIC | Age: 58
Discharge: HOME | End: 2023-10-19
Attending: FAMILY MEDICINE
Payer: COMMERCIAL

## 2023-10-19 PROCEDURE — H2035 A/D TX PROGRAM, PER HOUR: HCPCS | Mod: HQ

## 2023-10-19 PROCEDURE — H2035 A/D TX PROGRAM, PER HOUR: HCPCS

## 2023-10-19 PROCEDURE — 1002N00001 HC LODGING PLUS FACILITY CHARGE ADULT

## 2023-10-19 NOTE — GROUP NOTE
Group Therapy Documentation    PATIENT'S NAME: Nick Vargas  MRN:   [id number]  :   1965  ACCT. NUMBER: [id number]  DATE OF SERVICE: 10/19/23  START TIME:  9:00 AM  END TIME: 11:00 AM  FACILITATOR(S): Jose Carroll LADC  TOPIC: BEH Group Therapy  Number of patients attending the group:  10  Group Length:  2 Hours    Group Therapy Type: Recovery strategies and Emotion processing    Summary of Group / Topics Discussed:    Recovery Principles, Balanced lifestyle, Disease of addiction, Emotions/expression, Relapse prevention, and Self-care activities      Group Attendance:  Attended group session    Patient's response to the group topic/interactions:  cooperative with task, discussed personal experience with topic, expressed readiness to alter behaviors, expressed understanding of topic, gave appropriate feedback to peers, and listened actively    Patient appeared to be Actively participating, Attentive, and Engaged.        Client specific details:  Nick gave appropriate feedback. .     19-Sep-2020 02:31

## 2023-10-19 NOTE — PROGRESS NOTES
Pt is reporting increased constipation since admitting to [facility] pt was encouraged to take Senna PRN and discuss this with his medical provider who is has an appt with today. Pt agreed to alert nursing if symptoms did npt improve.

## 2023-10-19 NOTE — GROUP NOTE
Psychoeducation Group Documentation    PATIENT'S NAME: Nick Vargas  MRN:   [id number]  :   1965  ACCT. NUMBER: [id number]  DATE OF SERVICE: 10/19/23  START TIME:  3:00 PM  END TIME:  4:00 PM  FACILITATOR(S): Genoveva Hurley LADC; Tg Crow LADC  TOPIC: BEH Pyschoeducation  Number of patients attending the group:  25  Group Length:  1 Hours    Skills Group Therapy Type: Recovery skills and Relationship skills development    Summary of Group / Topics Discussed:    Relapse prevention skills and Addiction and the Brain          Group Attendance:  Attended group session    Patient's response to the group topic/interactions:  listened actively    Patient appeared to be Attentive.         Client specific details:  Patient attended presentation by Dr Lyles on addiction and the brain. Gaining knowledge about maintaining abstinence.

## 2023-10-20 ENCOUNTER — HOSPITAL ENCOUNTER (OUTPATIENT)
Dept: BEHAVIORAL HEALTH | Facility: CLINIC | Age: 58
Discharge: HOME | End: 2023-10-20
Attending: FAMILY MEDICINE
Payer: COMMERCIAL

## 2023-10-20 PROCEDURE — 1002N00001 HC LODGING PLUS FACILITY CHARGE ADULT

## 2023-10-20 PROCEDURE — H2035 A/D TX PROGRAM, PER HOUR: HCPCS | Mod: HQ

## 2023-10-20 NOTE — GROUP NOTE
Group Therapy Documentation    PATIENT'S NAME: Nick Vargas  MRN:   [id number]  :   1965  ACCT. NUMBER: [id number]  DATE OF SERVICE: 10/20/23  START TIME: 12:30 PM  END TIME:  2:30 PM  FACILITATOR(S): Eliza Freire LADC; Fabio Ngo LADC; Tg Crow LADC  TOPIC: BEH Group Therapy  Number of patients attending the group:  11  Group Length:  2 Hours    Group Therapy Type: Emotion processing    Summary of Group / Topics Discussed:    Recovery Principles and Sober coping skills      Group Attendance:  Attended group session    Patient's response to the group topic/interactions:  cooperative with task    Patient appeared to be Attentive and Engaged.        Client specific details: Nick attended afternoon group therapy session. Patient engaged in viewing the film, discussion and participated in sharing feedback to their peers.

## 2023-10-20 NOTE — GROUP NOTE
Group Therapy Documentation    PATIENT'S NAME: Nick Vargas  MRN:   [id number]  :   1965  ACCT. NUMBER: [id number]  DATE OF SERVICE: 10/20/23  START TIME:  9:00 AM  END TIME: 11:00 AM  FACILITATOR(S): Justino Romero LADC  TOPIC: BEH Group Therapy  Number of patients attending the group:  11  Group Length:  2 Hours    Group Therapy Type: Recovery strategies    Summary of Group / Topics Discussed:    Recovery Principles, Mindfulness/Relaxation, Coping/DBT informed care, Trauma informed care, Disease of addiction, and Emotions/expression      Group Attendance:  Attended group session    Patient's response to the group topic/interactions:  cooperative with task    Patient appeared to be Attentive and Engaged.        Client specific details:  Nick participated in morning group. He checked in and took part in the reading and discussion. For the second part of group he listened to and gave positive feedback on his peer's assignment.

## 2023-10-21 ENCOUNTER — HOSPITAL ENCOUNTER (OUTPATIENT)
Dept: BEHAVIORAL HEALTH | Facility: CLINIC | Age: 58
Discharge: HOME | End: 2023-10-21
Attending: FAMILY MEDICINE
Payer: COMMERCIAL

## 2023-10-21 PROCEDURE — H2035 A/D TX PROGRAM, PER HOUR: HCPCS | Mod: HQ

## 2023-10-21 PROCEDURE — 1002N00001 HC LODGING PLUS FACILITY CHARGE ADULT

## 2023-10-21 NOTE — GROUP NOTE
"Group Therapy Documentation    PATIENT'S NAME: Nick Vargas  MRN:   [id number]  :   1965  ACCT. NUMBER: [id number]  DATE OF SERVICE: 10/21/23  START TIME: 12:30 PM  END TIME:  2:30 PM  FACILITATOR(S): Tg Crow LADC; Jose Carroll LADC; Marya Dixon LADC  TOPIC: BEH Group Therapy  Number of patients attending the group:  28  Group Length:  2 Hours    Group Therapy Type: Recovery strategies    Summary of Group / Topics Discussed:    Recovery Principles, Relapse prevention, and Communication Styles    Facilitator NOE Castro presented a lecture on Communication in Recovery.  Communication Styles were identified,defined, and examples shared from every day life.     Patients engaged in \" [place] Activity\" to simulate the importance of communication with peers in a group setting.   Parallels were revealed from the activity to recovery and how assertive communication is applicable to various areas of recovery. ( Treatment Groups, Meetings, Sponsor, Sober Events, Therapy)      Group Attendance:  Attended group session    Patient's response to the group topic/interactions:  cooperative with task    Patient appeared to be Actively participating.        Client specific details:  Nick attended weekend  lecture on relapse prevention, remained engaged and participated in discussion. No further concerns reported      "

## 2023-10-21 NOTE — GROUP NOTE
Psychoeducation Group Documentation    PATIENT'S NAME: Nick Vargas  MRN:   [id number]  :   1965  ACCT. NUMBER: [id number]  DATE OF SERVICE: 10/21/23  START TIME:  9:00 AM  END TIME: 11:00 AM  FACILITATOR(S): [facility] Carroll [facility]; Tg Crow LADC; Marya Dixon LADC  TOPIC: BEH Pyschoeducation  Number of patients attending the group:  28  Group Length:  2 Hours    Skills Group Therapy Type: Recovery skills    Summary of Group / Topics Discussed:    Relapse prevention skills        Group Attendance:  Attended group session    Patient's response to the group topic/interactions:  cooperative with task and listened actively    Patient appeared to be Actively participating, Attentive, and Engaged.         Client specific details:  Nick gave appropriate feedback. .

## 2023-10-22 ENCOUNTER — HOSPITAL ENCOUNTER (OUTPATIENT)
Dept: BEHAVIORAL HEALTH | Facility: CLINIC | Age: 58
Discharge: HOME | End: 2023-10-22
Attending: FAMILY MEDICINE
Payer: COMMERCIAL

## 2023-10-22 PROCEDURE — H2035 A/D TX PROGRAM, PER HOUR: HCPCS | Mod: HQ

## 2023-10-22 PROCEDURE — 1002N00001 HC LODGING PLUS FACILITY CHARGE ADULT

## 2023-10-22 NOTE — GROUP NOTE
Psychoeducation Group Documentation    PATIENT'S NAME: Nick Vargas  MRN:   [id number]  :   1965  ACCT. NUMBER: [id number]  DATE OF SERVICE: 10/22/23  START TIME:  8:40 AM  END TIME: 10:30 AM  FACILITATOR(S): Jose Carroll LADC; Marya Dixon LADC; Laurita Martinez RN  TOPIC: BEH Pyschoeducation  Number of patients attending the group:  28  Group Length:  2 Hours    Skills Group Therapy Type: Healthy behaviors development    Summary of Group / Topics Discussed:    Balanced lifestyle skills        Group Attendance:  Attended group session    Patient's response to the group topic/interactions:  cooperative with task    Patient appeared to be Attentive.         Client specific details:  Nick gave appropriate feedback. .

## 2023-10-22 NOTE — GROUP NOTE
Psychoeducation Group Documentation    PATIENT'S NAME: Nick Vargas  MRN:   [id number]  :   1965  ACCT. NUMBER: [id number]  DATE OF SERVICE: 10/22/23  START TIME: 12:30 PM  END TIME:  1:30 PM  FACILITATOR(S): Marya Dixon LADC; Jose Carroll LADC  TOPIC: BEH Pyschoeducation  Number of patients attending the group:  28  Group Length:  1 Hours    Skills Group Therapy Type: Healthy behaviors development    Summary of Group / Topics Discussed:    Balanced lifestyle skills and Relapse prevention skills    Group Attendance:  Attended group session    Patient's response to the group topic/interactions:  cooperative with task    Patient appeared to be Attentive and Engaged.         Client specific details: Pt listened respectfully to a presentation on sexual boundaries and asked appropriate questions.

## 2023-10-22 NOTE — PROGRESS NOTES
"[facility] Weekly Treatment Plan Review      ATTENDANCE for the following date span: 10/16/2023 to 10/22/23     Patient did not have any absences during this time period (list absence dates and reason for absence).      Weekly Treatment Plan Review     Treatment Plan initiated on: 10/13/2023.     Dimension1: Acute Intoxication/Withdrawal Potential -   Previous Dimension Ratin  Current Dimension Ratin  Date of Last Use: 10/9/23   Any reports of withdrawal symptoms - No, pt reports \"This is the best he felt in Months\".       Dimension 2: Biomedical Conditions & Complications -   Previous Dimension Ratin  Current Dimension Ratin  Medical Concerns: Pt denies  Current Medications & Medication Changes:  Current Outpatient Medications   Medication    acetaminophen (TYLENOL) 325 MG tablet    alum & mag hydroxide-simethicone (MAALOX) 200-200-20 MG/5ML SUSP suspension    benzocaine-menthol (CEPACOL) 15-3.6 MG lozenge    fish oil-omega-3 fatty acids 1000 MG capsule    guaiFENesin-dextromethorphan (ROBITUSSIN DM) 100-10 MG/5ML syrup    ibuprofen (ADVIL/MOTRIN) 200 MG tablet    loratadine (CLARITIN) 10 MG tablet    melatonin 3 MG tablet    multivitamin w/minerals (THERA-VIT-M) tablet    senna-docusate (SENOKOT-S/PERICOLACE) 8.6-50 MG tablet    thiamine (B-1) 100 MG tablet    traZODone (DESYREL) 50 MG tablet     No current facility-administered medications for this encounter.     Facility-Administered Medications Ordered in Other Encounters   Medication    Self Administer Medications: Behavioral Services     Medication Prescriber: Pt did not report PCP at this time.   Taking meds as prescribed? Yes  Medication side effects or concerns: None reported  Outside medical appointments this week (list provider and reason for visit):    None reported    Narrative: Pt seems fully functioning and seeks medical attention as needed. Pt will continue to be monitored by RN. Pt reports no medical concerns/issues, or " "complications that would interfere with treatment programming at this time.       Dimension 3: Emotional/Behavioral Conditions & Complications -   Previous Dimension Ratin  Current Dimension Ratin  PHQ2:       10/21/2023    10:00 AM 10/18/2023    10:00 AM   PHQ-2 (  [employer])   Q1: Little interest or pleasure in doing things 0 0   Q2: Feeling down, depressed or hopeless 0 0   PHQ-2 Score 0 0      GAD2:       10/18/2023    10:00 AM 10/21/2023    10:00 AM   MARLEY-2   Feeling nervous, anxious, or on edge 0 0   Not being able to stop or control worrying 0 0   MARLEY-2 Total Score 0 0     Mental health diagnosis:   Date of last SIB: None reported  Date of  last SI:  None reported  Date of last HI: None reported  Behavioral Targets: Follow recommendations of medical provider. Report any alcohol use to counselor and any increase in withdrawal symptoms to nurse. Stabilize and maintain mental health.   Risk factors: Pt.denied having a mental health diagnose.Pt.may have grief and loss issues over the deaths of his mother and father. Pt.lacks sober coping skills and impulse control.    Protective factors:  spirituality, regular sleep, regular physical activity, abstinence from substances, agreement to use safety plan, and structured day  Current MH Assignments:   \"Grief and Loss\"  \"Mens issues in Recovery\"     Narrative: Current Mental Health symptoms include: Pt denied any diagnoses at this time. . See below for suicide risk assessment. Pt does not endorse thoughts of self-harm or suicide ideation at this time. Pt.completed PHQ-9 upon admission and scored 11/27 indicating moderate depression, and his MARLEY-7 score was / indicating mild anxiety.  He reports that her stress level has decreased; significantly changed this week; coping skills to manage stress used were utilizing the work-out room/gym. Pt.lacks sober coping skills and impulse control.  Pt.reports he has had no suicidal ideation. He has gained insight " "into how his addiction has affected his mental health.     [place] Suicide Severity Rating Scale (Short Version)      10/9/2023    10:38 AM 10/9/2023     6:00 PM 10/12/2023     3:00 PM   [place] Suicide Severity Rating (Short Version)   Over the past 2 weeks have you felt down, depressed, or hopeless? yes     Over the past 2 weeks have you had thoughts of killing yourself? no     Have you ever attempted to kill yourself? no     Q1 Wished to be Dead (Past Month)  no no   Q2 Suicidal Thoughts (Past Month)  no no   Q3 Suicidal Thought Method  no no   Q4 Suicidal Intent without Specific Plan  no no   Q5 Suicide Intent with Specific Plan  no no   Q6 Suicide Behavior (Lifetime)  no no   Level of Risk per Screen  low risk low risk         Dimension 4: Treatment Acceptance / Resistance -   Previous Dimension Ratin  Current Dimension Ratin  HERLINDA Diagnosis: 303.90 Alcohol Dependence    Commitment to tx process/Stage of change-Pt  appears to be in the contemplative stage of change at this time.  HERLINDA assignments - Drug Use History      Narrative - Pt reports his  motivation this week is \"Self\". Pt verbally reports being motivated for long-term recovery. Pt reports \"to get back home and care for his dog\" as his primary motivation to stay sober and in treatment this week. Pt' group attendance and participation have been positive overall. Pt reports a 10/10 when asked how motivated he is to get sober. Pt.continues to work on increasing his internal motivation to change his life style to maintain sobriety. Pt.continues to work on acceptance of the first step on a daily basis.     Dimension 5: Relapse / Continued Problem Potential -   Previous Dimension Ratin  Current Dimension Ratin  Relapses this week - None  Urges to use -  Pt denies any cravings or triggers. Pt reports he don't won't to die.   UA results - See chart     Narrative- Pt reports no cravings this past week. Pt reports craving 0/10, ten being " high. He remains a high risk for relapse at this time.   Pt reported not experiencing any triggers. Pt participated in the spirituality group, facilitated by Alyce Brown and  counseling staff was present during group. Pt participated in weekend workshop on relapse prevention and completed all activities. Pt.attended the relapse prevention workshop. He was asked to identify his relapse warning signs and triggers in thew areas of people,places,activities and feelings.     Dimension 6: Recovery Environment -   Previous Dimension Ratin  Current Dimension Ratin  Family Involvement - Pt denied  Family supportive of treatment?  No, pt denied, but he stated friends for support.   Recreational activities - Pt has been participating in offered program activities and leisure time with peers. Pt enjoys working out.   Community support group attendance - Pt is attending nightly sober support meetings/lectures.    Narrative - Pt is spending free time with same-gender peers and attending at least 3 virtual 12-step meetings weekly while in . She participated in weekend workshop on relationships and completed all activities.    Progress made on transition planning goals: Initial weekly, pt will be involved in treatment setting.     Justification for Continued Treatment at this Level of Care: Risk ratings indicate continued need for this level of care. Pt has been unable to maintain abstinence from alcohol while at the outpatient level of care, lacks long-term sober maintenance skills, lacks sober coping skills and has medical issues which are exacerbated by substance abuse. Pt.reports he lives alone and drinks alone. He lacks a sober support system. He does not have a sponsor   Treatment coordination activities this week:  None at this time.   Need for peer recovery support referral? No    Discharge Planning:  Target Discharge Date/Timeframe:  2023   Med Mgmt Provider/Appt: TBD   Ind therapy Provider/Appt:  TBD   Family therapy Provider/Appt: TBD    Other referrals:  TBD      Has vulnerable adult status changed? N/A    Interdisciplinary Clinical Supervision including: LADC and RN    Are Treatment Plan goals/objectives effective? Yes  *If no, list changes to treatment plan:    Are the current goals meeting client's needs? Yes  *If no, list the changes to treatment plan.    Patient Input / Response: Pt contributed to treatment plan review.    *Client agrees with any changes to the treatment plan: Yes  *Client received copy of changes: N/A  *Client is aware of right to access a treatment plan review: Yes    Rad Buitrago-ADC-T

## 2023-10-23 ENCOUNTER — HOSPITAL ENCOUNTER (OUTPATIENT)
Dept: BEHAVIORAL HEALTH | Facility: CLINIC | Age: 58
Discharge: HOME | End: 2023-10-23
Attending: FAMILY MEDICINE
Payer: COMMERCIAL

## 2023-10-23 PROCEDURE — 1002N00001 HC LODGING PLUS FACILITY CHARGE ADULT

## 2023-10-23 PROCEDURE — H2035 A/D TX PROGRAM, PER HOUR: HCPCS | Mod: HQ

## 2023-10-23 NOTE — GROUP NOTE
Group Therapy Documentation    PATIENT'S NAME: Nick Vargas  MRN:   [id number]  :   1965  ACCT. NUMBER: [id number]  DATE OF SERVICE: 10/23/23  START TIME: 12:30 PM  END TIME:  2:30 PM  FACILITATOR(S): Eliza Freire LADC  TOPIC: BEH Group Therapy  Number of patients attending the group:  12  Group Length:  2 Hours    Group Therapy Type: Emotion processing    Summary of Group / Topics Discussed:    Disease of addiction and Emotions/expression      Group Attendance:  Attended group session    Patient's response to the group topic/interactions:  cooperative with task    Patient appeared to be Actively participating, Attentive, and Engaged.        Client specific details: Nick was an active participant in afternoon group therapy session. He took part in a goal-setting exercise and offered feedback to a peer who presented his Grief and Loss assignment.

## 2023-10-23 NOTE — GROUP NOTE
Group Therapy Documentation    PATIENT'S NAME: Nick Vargas  MRN:   [id number]  :   1965  ACCT. NUMBER: [id number]  DATE OF SERVICE: 10/23/23  START TIME:  9:00 AM  END TIME: 11:00 AM  FACILITATOR(S): Jose Carroll LADC  TOPIC: BEH Group Therapy  Number of patients attending the group:  12  Group Length:  2 Hours    Group Therapy Type: Recovery strategies and Emotion processing    Summary of Group / Topics Discussed:    Recovery Principles, Sober coping skills, Balanced lifestyle, Disease of addiction, Emotions/expression, and Relapse prevention      Group Attendance:  Attended group session    Patient's response to the group topic/interactions:  cooperative with task, discussed personal experience with topic, expressed readiness to alter behaviors, expressed understanding of topic, gave appropriate feedback to peers, and listened actively    Patient appeared to be Actively participating, Attentive, and Engaged.        Client specific details:  Nick gave appropriate feedback. .

## 2023-10-24 ENCOUNTER — HOSPITAL ENCOUNTER (OUTPATIENT)
Dept: BEHAVIORAL HEALTH | Facility: CLINIC | Age: 58
Discharge: HOME | End: 2023-10-24
Attending: FAMILY MEDICINE
Payer: COMMERCIAL

## 2023-10-24 PROCEDURE — H2035 A/D TX PROGRAM, PER HOUR: HCPCS | Mod: HQ

## 2023-10-24 PROCEDURE — 1002N00001 HC LODGING PLUS FACILITY CHARGE ADULT

## 2023-10-24 NOTE — GROUP NOTE
Group Therapy Documentation    PATIENT'S NAME: Nick Vargas  MRN:   [id number]  :   1965  ACCT. NUMBER: [id number]  DATE OF SERVICE: 10/24/23  START TIME:  3:00 PM  END TIME:  4:00 PM  FACILITATOR(S): Eliza Freire LADC; Justino Romero LADC; Hanh Espinosa LADC  TOPIC: BEH Group Therapy  Number of patients attending the group:  26  Group Length:  1 Hours    Group Therapy Type: Recovery strategies    Summary of Group / Topics Discussed:    Recovery Principles and Coping/DBT informed care      Group Attendance:  Attended group session    Patient's response to the group topic/interactions:  cooperative with task    Patient appeared to be Attentive and Engaged.        Client specific details: Nick was an active participant in skills lecture on DBT Skills.

## 2023-10-24 NOTE — GROUP NOTE
"Group Therapy Documentation    PATIENT'S NAME: Nick Vargas  MRN:   [id number]  :   1965  ACCT. NUMBER: [id number]  DATE OF SERVICE: 10/24/23  START TIME:  9:00 AM  END TIME: 11:00 AM  FACILITATOR(S): Marya Dixon LADC  TOPIC: BEH Group Therapy  Number of patients attending the group:  12  Group Length:  2 Hours    Group Therapy Type: Emotion processing    Summary of Group / Topics Discussed:    Disease of addiction and Emotions/expression    Group Attendance:  Attended group session    Patient's response to the group topic/interactions:  cooperative with task    Patient appeared to be Attentive and Engaged.        Client specific details: Pt reported feeling hopeful and relieved, and shared that the following gets in his way: himself. He offered supportive feedback to his peer on his \"First Step\"assignment.  "

## 2023-10-24 NOTE — GROUP NOTE
Group Therapy Documentation    PATIENT'S NAME: Nick Vargas  MRN:   [id number]  :   1965  ACCT. NUMBER: [id number]  DATE OF SERVICE: 10/24/23  START TIME: 12:30 PM  END TIME:  2:30 PM  FACILITATOR(S): Justino Romero LADC  TOPIC: BEH Group Therapy  Number of patients attending the group:  12  Group Length:  2 Hours    Group Therapy Type: Recovery strategies    Summary of Group / Topics Discussed:    Recovery Principles, Mindfulness/Relaxation, Coping/DBT informed care, Trauma informed care, Disease of addiction, and Emotions/expression      Group Attendance:  Attended group session    Patient's response to the group topic/interactions:  cooperative with task    Patient appeared to be Attentive and Engaged.        Client specific details:  Nick participated in afternoon group. He took part in a discussion on willingness. He listened to and gave positive feedback on his peer's assignment. He took part in his peer's graduation.

## 2023-10-25 ENCOUNTER — HOSPITAL ENCOUNTER (OUTPATIENT)
Dept: BEHAVIORAL HEALTH | Facility: CLINIC | Age: 58
Discharge: HOME | End: 2023-10-25
Attending: FAMILY MEDICINE
Payer: COMMERCIAL

## 2023-10-25 PROCEDURE — 1002N00001 HC LODGING PLUS FACILITY CHARGE ADULT

## 2023-10-25 PROCEDURE — H2035 A/D TX PROGRAM, PER HOUR: HCPCS | Mod: HQ

## 2023-10-25 NOTE — GROUP NOTE
Group Therapy Documentation    PATIENT'S NAME: Nick Vargas  MRN:   [id number]  :   1965  ACCT. NUMBER: [id number]  DATE OF SERVICE: 10/25/23  START TIME:  9:45 AM  END TIME: 11:30 AM  FACILITATOR(S): Eliza Freire LADC  TOPIC: BEH Group Therapy  Number of patients attending the group:  12  Group Length:  2 Hours    Group Therapy Type: Emotion processing    Summary of Group / Topics Discussed:    Disease of addiction and Emotions/expression      Group Attendance:  Attended group session    Patient's response to the group topic/interactions:  cooperative with task    Patient appeared to be Actively participating, Attentive, and Engaged.        Client specific details: Nick was an active participant in morning group therapy session.

## 2023-10-25 NOTE — GROUP NOTE
Group Therapy Documentation    PATIENT'S NAME: Nick Vargas  MRN:   [id number]  :   1965  ACCT. NUMBER: [id number]  DATE OF SERVICE: 10/25/23  START TIME:  8:30 AM  END TIME:  9:30 AM  FACILITATOR(S): Tg Crow LADC; Fabio Ngo LADC; Eliza Freire LADC  TOPIC: BEH Group Therapy  Number of patients attending the group:  29  Group Length:  1 Hours    Group Therapy Type: Recovery strategies    Summary of Group / Topics Discussed:    Recovery Principles    Facilitator gave a lecture on Acceptance. Why is acceptance important in recovery, and what are the steps towards acceptance.   Acceptance of : self, addiction, shortcomings, accomplishments, change, loss, and overall change.       Group Attendance:  Attended group session    Patient's response to the group topic/interactions:  cooperative with task    Patient appeared to be Actively participating.        Client specific details:  Nick  attended Wednesday morning Psychoeducational lecture on acceptance. Patient remained engaged and particpaited in a question and answer session. No concerns reported.

## 2023-10-25 NOTE — GROUP NOTE
Group Therapy Documentation    PATIENT'S NAME: Nick Vargas  MRN:   [id number]  :   1965  ACCT. NUMBER: [id number]  DATE OF SERVICE: 10/25/23  START TIME: 12:30 PM  END TIME:  2:30 PM  FACILITATOR(S): Justino Romero LADC  TOPIC: BEH Group Therapy  Number of patients attending the group:  12  Group Length:  2 Hours    Group Therapy Type: Recovery strategies    Summary of Group / Topics Discussed:    Recovery Principles, Mindfulness/Relaxation, Coping/DBT informed care, Trauma informed care, Disease of addiction, and Emotions/expression      Group Attendance:  Attended group session    Patient's response to the group topic/interactions:  cooperative with task    Patient appeared to be Attentive and Engaged.        Client specific details:  Nick participated in afternoon group. He listened to and gave positive feedback on his peer's assignment.

## 2023-10-26 ENCOUNTER — HOSPITAL ENCOUNTER (OUTPATIENT)
Dept: BEHAVIORAL HEALTH | Facility: CLINIC | Age: 58
Discharge: HOME | End: 2023-10-26
Attending: FAMILY MEDICINE
Payer: COMMERCIAL

## 2023-10-26 PROCEDURE — 1002N00001 HC LODGING PLUS FACILITY CHARGE ADULT

## 2023-10-26 PROCEDURE — H2035 A/D TX PROGRAM, PER HOUR: HCPCS | Mod: HQ

## 2023-10-26 NOTE — GROUP NOTE
"Group Therapy Documentation    PATIENT'S NAME: Nick Vargas  MRN:   [id number]  :   1965  ACCT. NUMBER: [id number]  DATE OF SERVICE: 10/26/23  START TIME:  9:00 AM  END TIME: 11:00 AM  FACILITATOR(S): Marya Dixon LADC  TOPIC: BEH Group Therapy  Number of patients attending the group:  13  Group Length:  2 Hours    Group Therapy Type: Recovery strategies    Summary of Group / Topics Discussed:    Disease of addiction and Relapse prevention    Group Attendance:  Attended group session    Patient's response to the group topic/interactions:  cooperative with task    Patient appeared to be Actively participating, Attentive, and Engaged.        Client specific details: Pt reported feeling hopeful and proud, and shared that his spiritual beliefs include the idea that souls go to a better place and he will reunite with his parents some day. He offered supportive feedback to his peer on his \"First Step\" assignment.  "

## 2023-10-26 NOTE — GROUP NOTE
Group Therapy Documentation    PATIENT'S NAME: Nick Vargas  MRN:   [id number]  :   1965  ACCT. NUMBER: [id number]  DATE OF SERVICE: 10/26/23  START TIME: 12:30 PM  END TIME:  2:30 PM  FACILITATOR(S): Eliza Freire LADC; Alyce Burks  TOPIC: BEH Group Therapy  Number of patients attending the group:  13  Group Length:  2 Hours    Group Therapy Type: Emotion processing    Summary of Group / Topics Discussed:    Spiritual Care      Group Attendance:  Attended group session    Patient's response to the group topic/interactions:  cooperative with task    Patient appeared to be Actively participating, Attentive, and Engaged.        Client specific details: Nick was an active participant in spiritual care group session.

## 2023-10-26 NOTE — GROUP NOTE
Group Therapy Documentation    PATIENT'S NAME: Nick Vargas  MRN:   [id number]  :   1965  ACCT. NUMBER: [id number]  DATE OF SERVICE: 10/26/23  START TIME:  3:00 PM  END TIME:  4:00 PM  FACILITATOR(S): Larry Buitrago LADC; Alfredo Vazquez LADC; Tg Crow LADC  TOPIC: BEH Group Therapy  Number of patients attending the group:  32  Group Length:  2 Hours    Group Therapy Type: Recovery strategies    Summary of Group / Topics Discussed:    Emotions/expression    Group Facilitator addressed Strength in Recovery.   Physical, Emotional, Toughness, Solidarity, Power, Perseverance, Endurance   How do they all relate into ones recovery and help to maintain sobriety.       Group Attendance:  Attended group session    Patient's response to the group topic/interactions:  cooperative with task    Patient appeared to be Actively participating.        Client specific details:  Nick attended afternoon skills group and participated in processing discussion. Patient remained engaged and participated throughout group session.       NOE Flores

## 2023-10-27 ENCOUNTER — HOSPITAL ENCOUNTER (OUTPATIENT)
Dept: BEHAVIORAL HEALTH | Facility: CLINIC | Age: 58
Discharge: HOME | End: 2023-10-27
Attending: FAMILY MEDICINE
Payer: COMMERCIAL

## 2023-10-27 PROCEDURE — 1002N00001 HC LODGING PLUS FACILITY CHARGE ADULT

## 2023-10-27 PROCEDURE — H2035 A/D TX PROGRAM, PER HOUR: HCPCS | Mod: HQ

## 2023-10-27 NOTE — GROUP NOTE
Group Therapy Documentation    PATIENT'S NAME: Nick Vargas  MRN:   [id number]  :   1965  ACCT. NUMBER: [id number]  DATE OF SERVICE: 10/27/23  START TIME: 12:30 PM  END TIME:  2:30 PM  FACILITATOR(S): Marya Dixon LADC; Tg Crow LADC  TOPIC: BEH Group Therapy  Number of patients attending the group:  32  Group Length:  2 Hours    Group Therapy Type: Emotion processing    Summary of Group / Topics Discussed:    Relationship/socialization, Relapse prevention, and Self-care activities    Group Attendance:  Attended group session    Patient's response to the group topic/interactions:  cooperative with task    Patient appeared to be Attentive and Engaged.        Client specific details: Pt watched a recovery-related film with peers and participated in subsequent discussion.

## 2023-10-27 NOTE — GROUP NOTE
Group Therapy Documentation    PATIENT'S NAME: Nick Vargas  MRN:   [id number]  :   1965  ACCT. NUMBER: [id number]  DATE OF SERVICE: 10/27/23  START TIME:  9:00 AM  END TIME: 11:00 AM  FACILITATOR(S): Eliza Freire LADC  TOPIC: BEH Group Therapy  Number of patients attending the group:  14  Group Length:  2 Hours    Group Therapy Type: Emotion processing    Summary of Group / Topics Discussed:    Sober coping skills, Disease of addiction, and Relapse prevention      Group Attendance:  Attended group session    Patient's response to the group topic/interactions:  cooperative with task    Patient appeared to be Actively participating, Attentive, and Engaged.        Client specific details: Nick was an active participant in morning group therapy session. He engaged in a discussion on aftercare plans and gave feedback to a peer who presented his First Step assignment.

## 2023-10-28 ENCOUNTER — HOSPITAL ENCOUNTER (OUTPATIENT)
Dept: BEHAVIORAL HEALTH | Facility: CLINIC | Age: 58
Discharge: HOME | End: 2023-10-28
Attending: FAMILY MEDICINE
Payer: COMMERCIAL

## 2023-10-28 PROCEDURE — 1002N00001 HC LODGING PLUS FACILITY CHARGE ADULT

## 2023-10-28 PROCEDURE — H2035 A/D TX PROGRAM, PER HOUR: HCPCS | Mod: HQ

## 2023-10-28 NOTE — GROUP NOTE
Group Therapy Documentation    PATIENT'S NAME: Nick Vargas  MRN:   [id number]  :   1965  ACCT. NUMBER: [id number]  DATE OF SERVICE: 10/28/23  START TIME:  9:00 AM  END TIME: 11:00 AM  FACILITATOR(S): Justino Romero LADC  TOPIC: BEH Group Therapy  Number of patients attending the group:  14  Group Length:  2 Hours    Group Therapy Type: Recovery strategies    Summary of Group / Topics Discussed:    Recovery Principles, Relationship/socialization, Mindfulness/Relaxation, Coping/DBT informed care, Trauma informed care, Disease of addiction, and Emotions/expression      Group Attendance:  Attended group session    Patient's response to the group topic/interactions:  cooperative with task    Patient appeared to be Attentive and Engaged.        Client specific details:  The patient participated in the morning lecture on relationships.

## 2023-10-28 NOTE — GROUP NOTE
"Psychoeducation Group Documentation    PATIENT'S NAME: Nick Vargas  MRN:   [id number]  :   1965  ACCT. NUMBER: [id number]  DATE OF SERVICE: 10/28/23  START TIME: 12:30 PM  END TIME:  2:30 PM  FACILITATOR(S): Fabio Ngo LADC; Larry Buitrago LADC; Justino Romero LADC  TOPIC: BEH Pyschoeducation  Number of patients attending the group: 14  Group Length:  2 Hours    Skills Group Therapy Type: Recovery skills, Healthy behaviors development, and Relationship skills development    Summary of Group / Topics Discussed:    Patient's attended Lecture on  Relationships  and \"Effective Communication skills\". This activity was consistent with building building a foundation, recovery strategies, and goals setting. Patient engaged in Q&A after the lecture was presented.        Group Attendance:  Attended group session    Patient's response to the group topic/interactions:  cooperative with task    Patient appeared to be Engaged.         Client specific details:  Nick,.participated in skills lecture.      "

## 2023-10-29 ENCOUNTER — HOSPITAL ENCOUNTER (OUTPATIENT)
Dept: BEHAVIORAL HEALTH | Facility: CLINIC | Age: 58
Discharge: HOME | End: 2023-10-29
Attending: FAMILY MEDICINE
Payer: COMMERCIAL

## 2023-10-29 PROCEDURE — H2035 A/D TX PROGRAM, PER HOUR: HCPCS | Mod: HQ

## 2023-10-29 PROCEDURE — 1002N00001 HC LODGING PLUS FACILITY CHARGE ADULT

## 2023-10-29 NOTE — GROUP NOTE
Psychoeducation Group Documentation    PATIENT'S NAME: Nick Vargas  MRN:   [id number]  :   1965  ACCT. NUMBER: [id number]  DATE OF SERVICE: 10/29/23  START TIME:  8:45 AM  END TIME: 10:45 AM  FACILITATOR(S): See Stubbs RN; Larry Buitrago LADC; Justino Romero LADC  TOPIC: BEH Pyschoeducation  Number of patients attending the group: 14  Group Length:  2 Hours    Skills Group Therapy Type: Healthy behaviors development and Medication education    Summary of Group / Topics Discussed:    Lecture presented by: Nursing staff on HIV-Aids, and prevention and treatments. Patient engaged in Q&A after the lecture was presented. Patient actively engaged in group lecture. Each patient had an opportunity to process the information and ask questions.        Group Attendance:  Attended group session    Patient's response to the group topic/interactions:  cooperative with task    Patient appeared to be Attentive.         Client specific details:  Nick, engaged in Q&A after the lecture was presented.

## 2023-10-29 NOTE — GROUP NOTE
Psychoeducation Group Documentation    PATIENT'S NAME: Nick Vargas  MRN:   [id number]  :   1965  ACCT. NUMBER: [id number]  DATE OF SERVICE: 10/29/23  START TIME: 12:30 PM  END TIME:  1:30 PM  FACILITATOR(S): Justino Romero LADC; Larry Buitrago LADC  TOPIC: BEH Pyschoeducation  Number of patients attending the group:  14  Group Length:  1 Hours    Skills Group Therapy Type: Recovery skills    Summary of Group / Topics Discussed:    Relationship/social skills        Group Attendance:  Attended group session    Patient's response to the group topic/interactions:  cooperative with task    Patient appeared to be Attentive and Engaged.         Client specific details:  The patient participated in the afternoon lecture on boundaries and relationships.

## 2023-10-30 ENCOUNTER — HOSPITAL ENCOUNTER (OUTPATIENT)
Dept: BEHAVIORAL HEALTH | Facility: CLINIC | Age: 58
Discharge: HOME | End: 2023-10-30
Attending: FAMILY MEDICINE
Payer: COMMERCIAL

## 2023-10-30 PROCEDURE — 1002N00001 HC LODGING PLUS FACILITY CHARGE ADULT

## 2023-10-30 PROCEDURE — H2035 A/D TX PROGRAM, PER HOUR: HCPCS | Mod: HQ

## 2023-10-30 NOTE — GROUP NOTE
"Group Therapy Documentation    PATIENT'S NAME: Nick Vargas  MRN:   [id number]  :   1965  ACCT. NUMBER: [id number]  DATE OF SERVICE: 10/30/23  START TIME:  9:00 AM  END TIME: 11:00 AM  FACILITATOR(S): Marya Dixon LADC  TOPIC: BEH Group Therapy  Number of patients attending the group:  13  Group Length:  2 Hours    Group Therapy Type: Recovery strategies and Emotion processing    Summary of Group / Topics Discussed:    Recovery Principles, Disease of addiction, and Emotions/expression    Attendance:  Attended group session    Patient's response to the group topic/interactions:  cooperative with task    Patient appeared to be Actively participating, Attentive, and Engaged.        Client specific details: Pt reported feeling calm and happy, and shared that he had completed his weekly goal. He offered supportive feedback to his peer on his \"First Step\" assignment.  "

## 2023-10-30 NOTE — GROUP NOTE
"Group Therapy Documentation    PATIENT'S NAME: Nick Vargas  MRN:   [id number]  :   1965  ACCT. NUMBER: [id number]  DATE OF SERVICE: 10/30/23  START TIME: 12:30 PM  END TIME:  2:30 PM  FACILITATOR(S): Marya Dixon LADC  TOPIC: BEH Group Therapy  Number of patients attending the group:  14  Group Length:  2 Hours    Group Therapy Type: Recovery strategies    Summary of Group / Topics Discussed:    Recovery Principles and Relapse prevention    Group Attendance:  Attended group session    Patient's response to the group topic/interactions:  cooperative with task    Patient appeared to be Attentive and Engaged.        Client specific details: Pt offered supportive feedback to his peer on his \"First Step\" assignment. He took part in a group discussion about learned helplessness vs. self-reliance, goal-setting, and connecting with others.  "

## 2023-10-31 ENCOUNTER — HOSPITAL ENCOUNTER (OUTPATIENT)
Dept: BEHAVIORAL HEALTH | Facility: CLINIC | Age: 58
Discharge: HOME | End: 2023-10-31
Attending: FAMILY MEDICINE
Payer: COMMERCIAL

## 2023-10-31 PROCEDURE — 1002N00001 HC LODGING PLUS FACILITY CHARGE ADULT

## 2023-10-31 PROCEDURE — H2035 A/D TX PROGRAM, PER HOUR: HCPCS | Mod: HQ

## 2023-10-31 NOTE — GROUP NOTE
Group Therapy Documentation    PATIENT'S NAME: Nick Vargas  MRN:   [id number]  :   1965  ACCT. NUMBER: [id number]  DATE OF SERVICE: 10/31/23  START TIME:  9:00 AM  END TIME: 11:00 AM  FACILITATOR(S): Jose Carroll LADC  TOPIC: BEH Group Therapy  Number of patients attending the group: 15  Group Length:  2 Hours    Group Therapy Type: Recovery strategies    Summary of Group / Topics Discussed:    Recovery Principles, Disease of addiction, and Relapse prevention      Group Attendance:  Attended group session    Patient's response to the group topic/interactions:  cooperative with task and listened actively    Patient appeared to be Attentive.        Client specific details:  Nick gave appropriate feedback. .

## 2023-10-31 NOTE — GROUP NOTE
Group Therapy Documentation    PATIENT'S NAME: Nick Vargas  MRN:   [id number]  :   1965  ACCT. NUMBER: [id number]  DATE OF SERVICE: 10/31/23  START TIME: 12:30 PM  END TIME:  2:30 PM  FACILITATOR(S): Jose Carroll LADC  TOPIC: BEH Group Therapy  Number of patients attending the group:  14  Group Length:  2 Hours    Group Therapy Type: Recovery strategies    Summary of Group / Topics Discussed:    Recovery Principles, Disease of addiction, and Relapse prevention      Group Attendance:  Attended group session    Patient's response to the group topic/interactions:  cooperative with task and listened actively    Patient appeared to be Actively participating.        Client specific details:  Nick gave appropriate feedback. .

## 2023-10-31 NOTE — GROUP NOTE
Group Therapy Documentation    PATIENT'S NAME: Nick Vargas  MRN:   [id number]  :   1965  ACCT. NUMBER: [id number]  DATE OF SERVICE: 10/31/23  START TIME:  3:00 PM  END TIME:  4:00 PM  FACILITATOR(S): Tg Crow LADC; Larry Buitrago LADC; Alfredo Vazquez LADC  TOPIC: BEH Group Therapy  Number of patients attending the group:  33  Group Length:  1 Hours    Group Therapy Type: Recovery strategies    Summary of Group / Topics Discussed:    Mindfulness/Relaxation and Emotions/expression    Lecturer NOE Barnhart  Patients engaged in a 60 min skills group lecture, and were able to ask questions upon the end, and discuss what they gained from the presentation and how it incorporates in their recovery. The presentation focused on Mindfulness. A short Parker Talk video was shown, and discussion followed addressing the importance of mindfulness, how can it benefit daily living decrease stress and anxiety.       Group Attendance:  Attended group session    Patient's response to the group topic/interactions:  cooperative with task    Patient appeared to be Actively participating.        Client specific details:  Nick .skillsgroup

## 2023-10-31 NOTE — PROGRESS NOTES
"[facility] Weekly Treatment Plan Review      ATTENDANCE for the following date span: 10/22/23 to 10/30/23     Patient did not have any absences during this time period (list absence dates and reason for absence).      Weekly Treatment Plan Review     Treatment Plan initiated on: 10/13/2023.     Dimension1: Acute Intoxication/Withdrawal Potential -   Previous Dimension Ratin  Current Dimension Ratin  Date of Last Use: 10/9/23   Any reports of withdrawal symptoms - No, pt reports \"This is the best he felt in Months\".       Dimension 2: Biomedical Conditions & Complications -   Previous Dimension Ratin  Current Dimension Ratin  Medical Concerns: Pt denies  Current Medications & Medication Changes:  Current Outpatient Medications   Medication    acetaminophen (TYLENOL) 325 MG tablet    alum & mag hydroxide-simethicone (MAALOX) 200-200-20 MG/5ML SUSP suspension    benzocaine-menthol (CEPACOL) 15-3.6 MG lozenge    fish oil-omega-3 fatty acids 1000 MG capsule    guaiFENesin-dextromethorphan (ROBITUSSIN DM) 100-10 MG/5ML syrup    ibuprofen (ADVIL/MOTRIN) 200 MG tablet    loratadine (CLARITIN) 10 MG tablet    melatonin 3 MG tablet    multivitamin w/minerals (THERA-VIT-M) tablet    senna-docusate (SENOKOT-S/PERICOLACE) 8.6-50 MG tablet    thiamine (B-1) 100 MG tablet    traZODone (DESYREL) 50 MG tablet     No current facility-administered medications for this encounter.     Facility-Administered Medications Ordered in Other Encounters   Medication    Self Administer Medications: Behavioral Services     Medication Prescriber: Pt did not report PCP at this time.   Taking meds as prescribed? Yes  Medication side effects or concerns: None reported  Outside medical appointments this week (list provider and reason for visit):    None reported    Narrative: Pt seems fully functioning and seeks medical attention as needed. Pt will continue to be monitored by RN. Pt reports no medical concerns/issues, or " "complications that would interfere with treatment programming at this time.       Dimension 3: Emotional/Behavioral Conditions & Complications -   Previous Dimension Ratin  Current Dimension Ratin  PHQ2:       10/30/2023     4:00 PM 10/21/2023    10:00 AM 10/18/2023    10:00 AM   PHQ-2 (  [employer])   Q1: Little interest or pleasure in doing things 0 0 0   Q2: Feeling down, depressed or hopeless 0 0 0   PHQ-2 Score 0 0 0      GAD2:       10/18/2023    10:00 AM 10/21/2023    10:00 AM 10/30/2023     4:00 PM   MARLEY-2   Feeling nervous, anxious, or on edge 0 0 0   Not being able to stop or control worrying 0 0 0   MARLEY-2 Total Score 0 0 0     Mental health diagnosis:   Date of last SIB: None reported  Date of  last SI:  None reported  Date of last HI: None reported  Behavioral Targets: Follow recommendations of medical provider. Report any alcohol use to counselor and any increase in withdrawal symptoms to nurse. Stabilize and maintain mental health.   Risk factors: Pt.denied having a mental health diagnose.Pt.may have grief and loss issues over the deaths of his mother and father. Pt.lacks sober coping skills and impulse control.    Protective factors:  spirituality, regular sleep, regular physical activity, abstinence from substances, agreement to use safety plan, and structured day  Current  Assignments:   \"Grief and Loss\"  \"Mens issues in Recovery\"   \"Sober Coping Skills'    Narrative: Current Mental Health symptoms include: Pt denied any diagnoses at this time. . See below for suicide risk assessment. Pt does not endorse thoughts of self-harm or suicide ideation at this time.  He reports that his stress level has decreased; significantly this week; coping skills to manage stress used were utilizing the work-out room/gym. Pt.lacks sober coping skills and impulse control.  Pt.reports he has had no suicidal ideation. He has gained insight into how his addiction has affected his mental health.     Loly " "Suicide Severity Rating Scale (Short Version)      10/9/2023    10:38 AM 10/9/2023     6:00 PM 10/12/2023     3:00 PM   [place]enzie Suicide Severity Rating (Short Version)   Over the past 2 weeks have you felt down, depressed, or hopeless? yes     Over the past 2 weeks have you had thoughts of killing yourself? no     Have you ever attempted to kill yourself? no     Q1 Wished to be Dead (Past Month)  no no   Q2 Suicidal Thoughts (Past Month)  no no   Q3 Suicidal Thought Method  no no   Q4 Suicidal Intent without Specific Plan  no no   Q5 Suicide Intent with Specific Plan  no no   Q6 Suicide Behavior (Lifetime)  no no   Level of Risk per Screen  low risk low risk         Dimension 4: Treatment Acceptance / Resistance -   Previous Dimension Ratin  Current Dimension Ratin  HERLINDA Diagnosis: 303.90 Alcohol Dependence    Commitment to tx process/Stage of change-Pt  appears to be in the contemplative stage of change at this time.  HERLINDA assignments - Drug Use History      Narrative - Pt reports his  motivation this week is \"Self\". Pt verbally reports being motivated for long-term recovery. Pt reports \"to get back home and care for his dog\" as his primary motivation to stay sober and in treatment this week. Pt' group attendance and participation have been positive overall. Pt reports a 10/10 when asked how motivated he is to get sober. Pt.continues to work on increasing his internal motivation to change his life style to maintain sobriety. Pt.continues to work on acceptance of the first step on a daily basis.     Dimension 5: Relapse / Continued Problem Potential -   Previous Dimension Ratin  Current Dimension Ratin  Relapses this week - None  Urges to use -  Pt denies any cravings or triggers. Pt reports he don't won't to die.   UA results - See chart     Narrative- Pt reports no cravings this past week. Pt reports craving 0/10, ten being high. He remains a high risk for relapse at this time.   Pt reported not " experiencing any triggers. Pt participated in the spirituality group, facilitated by Alyce Brown and  counseling staff was present during group. Pt participated in weekend workshop on relapse prevention and completed all activities. Pt.attended the relapse prevention workshop. He was asked to identify his relapse warning signs and triggers in thew areas of people,places,activities and feelings. Pt.continues to work on his aftercare plan which includes AA and sponsor.    Dimension 6: Recovery Environment -   Previous Dimension Ratin  Current Dimension Ratin  Family Involvement - Pt denied  Family supportive of treatment?  No, pt denied, but he stated friends for support.   Recreational activities - Pt has been participating in offered program activities and leisure time with peers. Pt enjoys working out.   Community support group attendance - Pt is attending nightly sober support meetings/lectures.    Narrative - Pt is spending free time with same-gender peers and attending at least 3 virtual 12-step meetings weekly while in . She participated in weekend workshop on relationships and completed all activities.    Progress made on transition planning goals: Initial weekly, pt will be involved in treatment setting.     Justification for Continued Treatment at this Level of Care: Risk ratings indicate continued need for this level of care. Pt has been unable to maintain abstinence from alcohol while at the outpatient level of care, lacks long-term sober maintenance skills, lacks sober coping skills and has medical issues which are exacerbated by substance abuse. Pt.reports he lives alone and drinks alone. He lacks a sober support system. He does not have a sponsor   Treatment coordination activities this week:  None at this time.   Need for peer recovery support referral? No    Discharge Planning:  Target Discharge Date/Timeframe:  2023   Med Mgmt Provider/Appt: DAISY   Ind therapy Provider/Appt:  TBD   Family therapy Provider/Appt: TBD    Other referrals:  TBD      Has vulnerable adult status changed? N/A    Interdisciplinary Clinical Supervision including: LADC and RN    Are Treatment Plan goals/objectives effective? Yes  *If no, list changes to treatment plan:    Are the current goals meeting client's needs? Yes  *If no, list the changes to treatment plan.    Patient Input / Response: Pt contributed to treatment plan review.    *Client agrees with any changes to the treatment plan: Yes  *Client received copy of changes: N/A  *Client is aware of right to access a treatment plan review: Yes    Rad Buitrago-ADC-T

## 2023-11-01 ENCOUNTER — HOSPITAL ENCOUNTER (OUTPATIENT)
Dept: BEHAVIORAL HEALTH | Facility: CLINIC | Age: 58
Discharge: HOME | End: 2023-11-01
Attending: FAMILY MEDICINE
Payer: COMMERCIAL

## 2023-11-01 PROCEDURE — H2035 A/D TX PROGRAM, PER HOUR: HCPCS | Mod: HQ

## 2023-11-01 PROCEDURE — H2035 A/D TX PROGRAM, PER HOUR: HCPCS

## 2023-11-01 PROCEDURE — 1002N00001 HC LODGING PLUS FACILITY CHARGE ADULT

## 2023-11-01 NOTE — GROUP NOTE
Group Therapy Documentation    PATIENT'S NAME: Nick Vargas  MRN:   [id number]  :   1965  ACCT. NUMBER: [id number]  DATE OF SERVICE: 23  START TIME: 12:30 PM  END TIME:  2:30 PM  FACILITATOR(S): Eliza Freire LADC; Alyce Burks  TOPIC: BEH Group Therapy  Number of patients attending the group:  14  Group Length:  2 Hours    Group Therapy Type: Recovery strategies    Summary of Group / Topics Discussed:    Spiritual Care      Group Attendance:  Attended group session    Patient's response to the group topic/interactions:  cooperative with task    Patient appeared to be Actively participating, Attentive, and Engaged.        Client specific details: Nick was an active participant in spiritual care group session.

## 2023-11-01 NOTE — GROUP NOTE
Group Therapy Documentation    PATIENT'S NAME: Nick Vargas  MRN:   [id number]  :   1965  ACCT. NUMBER: [id number]  DATE OF SERVICE: 23  START TIME:  9:40 AM  END TIME: 11:30 AM  FACILITATOR(S): Jose Carroll LADC  TOPIC: BEH Group Therapy  Number of patients attending the group:  14  Group Length:  2 Hours    Group Therapy Type: Recovery strategies and Emotion processing    Summary of Group / Topics Discussed:    Recovery Principles, Balanced lifestyle, Disease of addiction, and Relapse prevention      Group Attendance:  Attended group session    Patient's response to the group topic/interactions:  cooperative with task, discussed personal experience with topic, expressed readiness to alter behaviors, expressed understanding of topic, gave appropriate feedback to peers, and listened actively    Patient appeared to be Actively participating, Attentive, and Engaged.        Client specific details:  Nick gave appropriate feedback. .

## 2023-11-01 NOTE — GROUP NOTE
Psychoeducation Group Documentation    PATIENT'S NAME: Ncik Vargas  MRN:   [id number]  :   1965  ACCT. NUMBER: [id number]  DATE OF SERVICE: 23  START TIME:  8:30 AM  END TIME:  9:30 AM  FACILITATOR(S): Jose Carroll LADC; Iftikhar May MD  TOPIC: BEH Pyschoeducation  Number of patients attending the group:  30  Group Length:  1 Hours    Skills Group Therapy Type: Recovery skills    Summary of Group / Topics Discussed:    Symptom management skills        Group Attendance:  Attended group session    Patient's response to the group topic/interactions:  cooperative with task and listened actively    Patient appeared to be Attentive.         Client specific details:  Nick gave appropriate feedback. .

## 2023-11-02 ENCOUNTER — HOSPITAL ENCOUNTER (OUTPATIENT)
Dept: BEHAVIORAL HEALTH | Facility: CLINIC | Age: 58
Discharge: HOME | End: 2023-11-02
Attending: FAMILY MEDICINE
Payer: COMMERCIAL

## 2023-11-02 PROCEDURE — H2035 A/D TX PROGRAM, PER HOUR: HCPCS | Mod: HQ

## 2023-11-02 PROCEDURE — 1002N00001 HC LODGING PLUS FACILITY CHARGE ADULT

## 2023-11-02 NOTE — GROUP NOTE
"Group Therapy Documentation    PATIENT'S NAME: Nick Vargas  MRN:   [id number]  :   1965  ACCT. NUMBER: [id number]  DATE OF SERVICE: 23  START TIME: 12:30 PM  END TIME:  2:30 PM  FACILITATOR(S): Marya Dixon LADC  TOPIC: BEH Group Therapy  Number of patients attending the group:  12  Group Length:  2 Hours    Group Therapy Type: Recovery strategies    Summary of Group / Topics Discussed:    Recovery Principles and Relapse prevention    Group Attendance:  Attended group session    Patient's response to the group topic/interactions:  cooperative with task    Patient appeared to be Actively participating, Attentive, and Engaged.        Client specific details:  Pt offered his peer supportive feedback on his \"Drug Use History,\"  and took part in a group discussion of the benefits of/personal gains from treatment.  "

## 2023-11-02 NOTE — GROUP NOTE
Group Therapy Documentation    PATIENT'S NAME: Nick Vargas  MRN:   [id number]  :   1965  ACCT. NUMBER: [id number]  DATE OF SERVICE: 23  START TIME:  9:00 AM  END TIME: 11:00 AM  FACILITATOR(S): Jose Carroll LADC  TOPIC: BEH Group Therapy  Number of patients attending the group:  13  Group Length:  2 Hours    Group Therapy Type: Recovery strategies and Emotion processing    Summary of Group / Topics Discussed:    Recovery Principles, Sober coping skills, Balanced lifestyle, Disease of addiction, Emotions/expression, and Relapse prevention      Group Attendance:  Attended group session    Patient's response to the group topic/interactions:  cooperative with task, discussed personal experience with topic, expressed readiness to alter behaviors, expressed understanding of topic, gave appropriate feedback to peers, and listened actively    Patient appeared to be Actively participating, Attentive, and Engaged.        Client specific details:  Nick gave appropriate feedback. .

## 2023-11-02 NOTE — GROUP NOTE
Group Therapy Documentation    PATIENT'S NAME: Nick Vargas  MRN:   [id number]  :   1965  ACCT. NUMBER: [id number]  DATE OF SERVICE: 23  START TIME:  3:00 PM  END TIME:  4:00 PM  FACILITATOR(S): Eliza Freire LADC; CHRISTOPHER MERCEDES; Fabio Ngo LADC  TOPIC: BEH Group Therapy  Number of patients attending the group:  26  Group Length:  1 Hours    Group Therapy Type: Recovery strategies    Summary of Group / Topics Discussed:    Recovery Principles and Co-occurring illnesses symptom management      Group Attendance:  Attended group session    Patient's response to the group topic/interactions:  cooperative with task    Patient appeared to be Attentive and Engaged.        Client specific details: Nick was attentive during skills group on problematic gambling addiction and treatment.

## 2023-11-03 ENCOUNTER — HOSPITAL ENCOUNTER (OUTPATIENT)
Dept: BEHAVIORAL HEALTH | Facility: CLINIC | Age: 58
Discharge: HOME | End: 2023-11-03
Attending: FAMILY MEDICINE
Payer: COMMERCIAL

## 2023-11-03 PROCEDURE — 1002N00001 HC LODGING PLUS FACILITY CHARGE ADULT

## 2023-11-03 PROCEDURE — H2035 A/D TX PROGRAM, PER HOUR: HCPCS | Mod: HQ

## 2023-11-03 NOTE — GROUP NOTE
Group Therapy Documentation    PATIENT'S NAME: Nick Vargas  MRN:   [id number]  :   1965  ACCT. NUMBER: [id number]  DATE OF SERVICE: 23  START TIME:  9:00 AM  END TIME: 11:00 AM  FACILITATOR(S): Jose Carroll LADC  TOPIC: BEH Group Therapy  Number of patients attending the group:  12  Group Length:  2 Hours    Group Therapy Type: Recovery strategies and Emotion processing    Summary of Group / Topics Discussed:    Recovery Principles, Sober coping skills, Balanced lifestyle, Disease of addiction, Emotions/expression, and Relapse prevention      Group Attendance:  Attended group session    Patient's response to the group topic/interactions:  cooperative with task, discussed personal experience with topic, expressed readiness to alter behaviors, expressed understanding of topic, gave appropriate feedback to peers, and listened actively    Patient appeared to be Actively participating, Attentive, and Engaged.        Client specific details:  Nick gave appropriate feedback. .He presented his drug use history assignment.

## 2023-11-03 NOTE — GROUP NOTE
"Group Therapy Documentation    PATIENT'S NAME: Nick Vargas  MRN:   [id number]  :   1965  ACCT. NUMBER: [id number]  DATE OF SERVICE: 23  START TIME: 12:30 PM  END TIME:  2:30 PM  FACILITATOR(S): Larry Buitrago LADC; Jose Carroll LADC  TOPIC: BEH Group Therapy  Number of patients attending the group: 12  Group Length:  2 Hours    Group Therapy Type: Recovery strategies and Emotion processing    Summary of Group / Topics Discussed:    Patients watched \"Unguarded \". Patients then discussed how they were able to connect emotionally with certain scenes and how to implement coping skills as part of their recovery. Patients also had an opportunity to process the information seen/noticed and ask questions.      Group Attendance:  Attended group session    Patient's response to the group topic/interactions:  cooperative with task    Patient appeared to be Attentive.        Client specific details:  Nick,.engaged in group discussion regarding the movie.    "

## 2023-11-04 ENCOUNTER — HOSPITAL ENCOUNTER (OUTPATIENT)
Dept: BEHAVIORAL HEALTH | Facility: CLINIC | Age: 58
Discharge: HOME | End: 2023-11-04
Attending: FAMILY MEDICINE
Payer: COMMERCIAL

## 2023-11-04 PROCEDURE — H2035 A/D TX PROGRAM, PER HOUR: HCPCS | Mod: HQ

## 2023-11-04 PROCEDURE — 1002N00001 HC LODGING PLUS FACILITY CHARGE ADULT

## 2023-11-04 NOTE — GROUP NOTE
Group Therapy Documentation    PATIENT'S NAME: Nick Vargas  MRN:   [id number]  :   1965  ACCT. NUMBER: [id number]  DATE OF SERVICE: 23  START TIME: 12:30 PM  END TIME:  2:30 PM  FACILITATOR(S): Hanh Espinosa LADC; Eliza Freire LADC; Justino Romero LADC  TOPIC: BEH Group Therapy  Number of patients attending the group:  30  Group Length:  2 Hours    Group Therapy Type: Recovery strategies    Summary of Group / Topics Discussed:    Recovery Principles and Relationship/socialization  Identifying people in our recovery - supportive, neutral and destructive; who are sober, educated and supportive.      Group Attendance:  Attended group session    Patient's response to the group topic/interactions:  cooperative with task    Patient appeared to be Passively engaged.        Client specific details:  Nick shared appropriate feedback.

## 2023-11-04 NOTE — GROUP NOTE
Group Therapy Documentation    PATIENT'S NAME: Nick Vargas  MRN:   [id number]  :   1965  ACCT. NUMBER: [id number]  DATE OF SERVICE: 23  START TIME:  9:00 AM  END TIME: 11:00 AM  FACILITATOR(S): Eliza Freire LADC; Hanh Espinosa LADC; Justino Romero LADC  TOPIC: BEH Group Therapy  Number of patients attending the group:  30  Group Length:  2 Hours    Group Therapy Type: Recovery strategies    Summary of Group / Topics Discussed:    Relationship/socialization      Group Attendance:  Attended group session    Patient's response to the group topic/interactions:  cooperative with task    Patient appeared to be Attentive and Engaged.        Client specific details: Nick was an active participant in Relationships weekend workshop on boundaries.

## 2023-11-05 ENCOUNTER — HOSPITAL ENCOUNTER (OUTPATIENT)
Dept: BEHAVIORAL HEALTH | Facility: CLINIC | Age: 58
Discharge: HOME | End: 2023-11-05
Attending: FAMILY MEDICINE
Payer: COMMERCIAL

## 2023-11-05 PROCEDURE — 1002N00001 HC LODGING PLUS FACILITY CHARGE ADULT

## 2023-11-05 PROCEDURE — H2035 A/D TX PROGRAM, PER HOUR: HCPCS | Mod: HQ

## 2023-11-05 NOTE — PROGRESS NOTES
"[facility] Weekly Treatment Plan Review    ATTENDANCE FOR THE FOLLOWING DATE SPAN: 10/31/23 TO 23    Weekly Treatment Plan Review     Treatment Plan initiated on: 10/13/23.    Dimension1: Acute Intoxication/Withdrawal Potential -   Previous Dimension Ratin  Current Dimension Ratin  Date of Last Use: 10/9/23  Any reports of withdrawal symptoms - No      Dimension 2: Biomedical Conditions & Complications -   Previous Dimension Ratin  Current Dimension Ratin  Medical Concerns:  \"Some headaches\"  Current Medications & Medication Changes:  Current Outpatient Medications   Medication    acetaminophen (TYLENOL) 325 MG tablet    alum & mag hydroxide-simethicone (MAALOX) 200-200-20 MG/5ML SUSP suspension    benzocaine-menthol (CEPACOL) 15-3.6 MG lozenge    fish oil-omega-3 fatty acids 1000 MG capsule    guaiFENesin-dextromethorphan (ROBITUSSIN DM) 100-10 MG/5ML syrup    ibuprofen (ADVIL/MOTRIN) 200 MG tablet    loratadine (CLARITIN) 10 MG tablet    melatonin 3 MG tablet    multivitamin w/minerals (THERA-VIT-M) tablet    senna-docusate (SENOKOT-S/PERICOLACE) 8.6-50 MG tablet    thiamine (B-1) 100 MG tablet    traZODone (DESYREL) 50 MG tablet     No current facility-administered medications for this encounter.     Facility-Administered Medications Ordered in Other Encounters   Medication    Self Administer Medications: Behavioral Services     Medication Prescriber: See chart  Taking meds as prescribed? Yes  Medication side effects or concerns: None reported  Outside medical appointments this week (list provider and reason for visit):  None      Dimension 3: Emotional/Behavioral Conditions & Complications -   Previous Dimension Ratin  Current Dimension Ratin  PHQ2:       2023     1:00 PM 10/30/2023     4:00 PM 10/21/2023    10:00 AM 10/18/2023    10:00 AM   PHQ-2 (  [employer])   Q1: Little interest or pleasure in doing things 0 0 0 0   Q2: Feeling down, depressed or hopeless 0 0 0 " "0   PHQ-2 Score 0 0 0 0      GAD2:       10/18/2023    10:00 AM 10/21/2023    10:00 AM 10/30/2023     4:00 PM 2023     1:00 PM   MARLEY-2   Feeling nervous, anxious, or on edge 0 0 0 1   Not being able to stop or control worrying 0 0 0 0   MARLEY-2 Total Score 0 0 0 1     Mental health diagnosis: None reported  Date of last SIB:  NA  Date of  last SI:  NA  Date of last HI: NA  Behavioral Targets:  Stabilize and maintain mental health.  Risk factors:  Early recovery, grief/loss, minimal sober coping skills and impulse controls  Protective factors:  spirituality, forward/future oriented thinking, abstinence from substances, agreement to use safety plan, living with other people, structured day, and access to a variety of clinical interventions  Current MH Assignments:  All completed    Narrative:  Current Mental Health symptoms include: None identified.  Patient reports his mood continues to improve, stating he is \"feeling way better\". He identifies his stress level increasing due to \"thinking of my discharge date\", but utilizes peer and staff support effectively.  Patient reports exercise and talking with peers/staff as his primary coping skills he uses to manage stress and difficult emotions.  Patient denies any suicidal thoughts or ideations at this time. Patient will continue to be monitored throughout treatment.       Dimension 4: Treatment Acceptance / Resistance -   Previous Dimension Ratin  Current Dimension Ratin  HERLINDA Diagnosis:  Alcohol Use Disorder   303.90 (F10.20) Severe In a controlled environment  Commitment to tx process/Stage of change- Contemplation  HERLINDA assignments - All completed      Narrative - Patient verbally reports being motivated for long-term recovery. Patient reports \"my life\" as his primary motivation to stay sober and in treatment this week.  Patient's group attendance and participation have been positive overall.       Dimension 5: Relapse / Continued Problem Potential - " "  Previous Dimension Ratin  Current Dimension Ratin  Relapses this week - None  Urges to use - YES, List \"4/10\"  UA results - No results found for this or any previous visit (from the past 168 hour(s)).    Narrative- Patient rated his cravings this week on a scale from 1(low) to 10(high) as a \"4\".  He reports his triggers this week were \"thinking about my discharge date\". He reports \"pray, ask God for help and the counselors I have are awesome\" as the coping skills he has utilized to manage these cravings. Patient attended Spirituality Group facilitated by Alyce Burks.  He continues to work on developing relapse prevention strategies and sober coping skills.    Dimension 6: Recovery Environment -   Previous Dimension Ratin  Current Dimension Ratin  Family Involvement - Patient reports having contact with loved ones.  Summarize attendance at family groups and family sessions - NA  Family supportive of treatment?  Yes    Community support group attendance - Patient is attending nightly sober support meetings/lectures.  Recreational activities - Patient has been participating in offered program activities and leisure time with peers.    Narrative -  Patient has been spending time with same gender-peers and connecting with/building a sober support network. Patient reports his aftercare plan will include \"AA, outpatient here, go to [facility]\".    Progress made on transition planning goals: Patient will work with  staff to develop an aftercare plan that is conducive to recovery.    Justification for Continued Treatment at this Level of Care:    Patient continues to work on completing treatment plan goals and interventions.   Patient remains a high risk for relapse at this time and would benefit from continued support in developing relapse prevention strategies.   Patient's mental health symptoms are currently impacting his daily functioning and would benefit from continued support.    Treatment " coordination activities this week:   none  Need for peer recovery support referral? No    Discharge Planning:  Target Discharge Date/Timeframe: 11/9/23   Med Mgmt Provider/Appt:  TBD   Ind therapy Provider/Appt:  TBD   Family therapy Provider/Appt:  TBD   Other referrals:  TBD      Has vulnerable adult status changed? No    Interdisciplinary Clinical Supervision including: LADC, Mental health professional, RN, and Supervisor/manager    Are Treatment Plan goals/objectives effective? Yes  *If no, list changes to treatment plan:    Are the current goals meeting client's needs? Yes  *If no, list the changes to treatment plan.    Client Input / Response: Patient contributed to treatment plan review.      *Client agrees with any changes to the treatment plan: N/A  *Client received copy of changes: N/A  *Client is aware of right to access a treatment plan review: Yes

## 2023-11-05 NOTE — GROUP NOTE
Group Therapy Documentation    PATIENT'S NAME: Nick Vargas  MRN:   [id number]  :   1965  ACCT. NUMBER: [id number]  DATE OF SERVICE: 23  START TIME: 12:30 PM  END TIME:  1:30 PM  FACILITATOR(S): Eliza Freire LADC; Justino Romero LADC  TOPIC: BEH Group Therapy  Number of patients attending the group:  30  Group Length:  1 Hours    Group Therapy Type: Recovery strategies    Summary of Group / Topics Discussed:    Recovery Principles, Sober coping skills, and Relapse prevention      Group Attendance:  Attended group session    Patient's response to the group topic/interactions:  cooperative with task    Patient appeared to be Attentive and Engaged.        Client specific details: Nick was an active participant in workshop on recovery resources and sober support networks.

## 2023-11-05 NOTE — GROUP NOTE
Psychoeducation Group Documentation    PATIENT'S NAME: Nick Vargas  MRN:   [id number]  :   1965  ACCT. NUMBER: [id number]  DATE OF SERVICE: 23  START TIME:  8:45 AM  END TIME: 10:45 AM  FACILITATOR(S): Justino Romero LADC; Eliza Freire LADC; Laurita Martinez RN  TOPIC: BEH Pyschoeducation  Number of patients attending the group:  29  Group Length:  2 Hours    Skills Group Therapy Type: Healthy behaviors development    Summary of Group / Topics Discussed:    Relationship/social skills and Balanced lifestyle skills        Group Attendance:  Attended group session    Patient's response to the group topic/interactions:  cooperative with task    Patient appeared to be Attentive and Engaged.         Client specific details:  The patient participated in the morning nursing lecture on Hep C.

## 2023-11-06 ENCOUNTER — TELEPHONE (OUTPATIENT)
Dept: BEHAVIORAL HEALTH | Facility: CLINIC | Age: 58
End: 2023-11-06
Payer: COMMERCIAL

## 2023-11-06 ENCOUNTER — HOSPITAL ENCOUNTER (OUTPATIENT)
Dept: BEHAVIORAL HEALTH | Facility: CLINIC | Age: 58
Discharge: HOME | End: 2023-11-06
Attending: FAMILY MEDICINE
Payer: COMMERCIAL

## 2023-11-06 PROCEDURE — H2035 A/D TX PROGRAM, PER HOUR: HCPCS | Mod: HQ

## 2023-11-06 PROCEDURE — 1002N00001 HC LODGING PLUS FACILITY CHARGE ADULT

## 2023-11-06 NOTE — TELEPHONE ENCOUNTER
----- Message from NOE Betts sent at 11/6/2023 10:57 AM CST -----  Regarding: Phase II Referral  Scheduling Request     Patient Name: Nick Vargas  Location of programming:  5[facility]   Start Date: November / 9/ 2023   Group: [id number] on Thursday at 5:30 PM to 7:00 PM   Attending Provider (MD): Annita Keane   Number of visits to be scheduled: 10   Duration of Appointment in minutes: 90   Visit Type: In-person or Treatment - 870     Additional notes: Phase 2, group B2

## 2023-11-06 NOTE — GROUP NOTE
Group Therapy Documentation    PATIENT'S NAME: Nick Vargas  MRN:   [id number]  :   1965  ACCT. NUMBER: [id number]  DATE OF SERVICE: 23  START TIME:  9:00 AM  END TIME: 11:00 AM  FACILITATOR(S): Eliza Freire LADC  TOPIC: BEH Group Therapy  Number of patients attending the group:  11  Group Length:  2 Hours    Group Therapy Type: Emotion processing    Summary of Group / Topics Discussed:    Recovery Principles, Disease of addiction, and Emotions/expression      Group Attendance:  Attended group session    Patient's response to the group topic/interactions:  cooperative with task    Patient appeared to be Actively participating, Attentive, and Engaged.        Client specific details: Nick was an active participant in morning group therapy session. He took part in a peer graduation ceremony, group process time, and goal-setting exercise.

## 2023-11-06 NOTE — GROUP NOTE
"Group Therapy Documentation    PATIENT'S NAME: Nick Vargas  MRN:   [id number]  :   1965  ACCT. NUMBER: [id number]  DATE OF SERVICE: 23  START TIME: 12:30 PM  END TIME:  2:30 PM  FACILITATOR(S): Marya Dixon LADC  TOPIC: BEH Group Therapy  Number of patients attending the group:  11  Group Length:  2 Hours    Group Therapy Type: Recovery strategies and Emotion processing    Summary of Group / Topics Discussed:    Emotions/expression and Relapse prevention      Group Attendance:  Attended group session    Patient's response to the group topic/interactions:  cooperative with task    Patient appeared to be Attentive and Engaged.        Client specific details: Pt offered supportive feedback to his peers on their \"First Step\" and \"Honesty\" assignments.  "

## 2023-11-07 ENCOUNTER — HOSPITAL ENCOUNTER (OUTPATIENT)
Dept: BEHAVIORAL HEALTH | Facility: CLINIC | Age: 58
Discharge: HOME | End: 2023-11-07
Attending: FAMILY MEDICINE
Payer: COMMERCIAL

## 2023-11-07 PROCEDURE — H2035 A/D TX PROGRAM, PER HOUR: HCPCS | Mod: HQ

## 2023-11-07 PROCEDURE — 1002N00001 HC LODGING PLUS FACILITY CHARGE ADULT

## 2023-11-07 NOTE — GROUP NOTE
"Psychoeducation Group Documentation    PATIENT'S NAME: Nick Vargas  MRN:   [id number]  :   1965  ACCT. NUMBER: [id number]  DATE OF SERVICE: 23  START TIME:  3:00 PM  END TIME:  4:00 PM  FACILITATOR(S): Larry Buitrago LADC; Uzair Catalan LADC; Hanh Espinosa LADC  TOPIC: BEH Pyschoeducation  Number of patients attending the group: 12  Group Length:  1 Hours    Skills Group Therapy Type: \"Healthy and Wellness\"    Summary of Group / Topics Discussed:    Skills Group led by Dr. Boland: Research study for patients to provider a look at all the modifiable aspects that may lead to disease such as lifestyle (including nutrition, exercise, sleep, stress management, social connections, substance free) and environment. They not only work towards improving your symptoms, but their goal is to improve your quality of life by moving you towards wellness. It's important to us, to understand what goals you want to achieve and help you work towards it.        Group Attendance:  Attended group session    Patient's response to the group topic/interactions:  cooperative with task    Patient appeared to be Attentive.         Client specific details:  Nick, listened carefully during lecture. .      "

## 2023-11-07 NOTE — GROUP NOTE
Group Therapy Documentation    PATIENT'S NAME: Nick Vargas  MRN:   [id number]  :   1965  ACCT. NUMBER: [id number]  DATE OF SERVICE: 23  START TIME:  9:00 AM  END TIME: 11:00 AM  FACILITATOR(S): Eliza Freire LADC  TOPIC: BEH Group Therapy  Number of patients attending the group:  12  Group Length:  2 Hours    Group Therapy Type: Emotion processing    Summary of Group / Topics Discussed:    Disease of addiction and Emotions/expression      Group Attendance:  Attended group session    Patient's response to the group topic/interactions:  cooperative with task    Patient appeared to be Actively participating, Attentive, and Engaged.        Client specific details: Nick was an active participant in morning group therapy session. He participated in a discussion on the change process and gave supportive feedback to a peer who shared his First Step assignment.

## 2023-11-07 NOTE — GROUP NOTE
"Group Therapy Documentation    PATIENT'S NAME: Nick Vargas  MRN:   [id number]  :   1965  ACCT. NUMBER: [id number]  DATE OF SERVICE: 23  START TIME: 12:30 PM  END TIME:  2:30 PM  FACILITATOR(S): Marya Dixon LADC  TOPIC: BEH Group Therapy  Number of patients attending the group:  12  Group Length:  2 Hours    Group Therapy Type: Recovery strategies    Summary of Group / Topics Discussed:    Recovery Principles and Relapse prevention    Group Attendance:  Attended group session    Patient's response to the group topic/interactions:  cooperative with task    Patient appeared to be Actively participating, Attentive, and Engaged.        Client specific details: Pt offered appropriate feedback to his peer on his \"Triggers\" inventory and his \"Relapse Prevention Plan.\"  "

## 2023-11-08 ENCOUNTER — HOSPITAL ENCOUNTER (OUTPATIENT)
Dept: BEHAVIORAL HEALTH | Facility: CLINIC | Age: 58
Discharge: HOME | End: 2023-11-08
Attending: FAMILY MEDICINE
Payer: COMMERCIAL

## 2023-11-08 PROCEDURE — H2035 A/D TX PROGRAM, PER HOUR: HCPCS | Mod: HQ

## 2023-11-08 PROCEDURE — 1002N00001 HC LODGING PLUS FACILITY CHARGE ADULT

## 2023-11-08 NOTE — GROUP NOTE
Psychoeducation Group Documentation    PATIENT'S NAME: Nick Vargas  MRN:   [id number]  :   1965  ACCT. NUMBER: [id number]  DATE OF SERVICE: 23  START TIME:  8:30 AM  END TIME:  9:30 AM  FACILITATOR(S): Marya Dixon LADC; Fabio Ngo LADC  TOPIC: BEH Pyschoeducation  Number of patients attending the group:  24  Group Length:  1 Hours    Skills Group Therapy Type: Daily living/independence skills    Summary of Group / Topics Discussed:    Symptom management skills          Group Attendance:  {Group Attendance:884166}    Patient's response to the group topic/interactions:  {OPBEHCLIENTRESPONSE:329284}    Patient appeared to be {Engagement:361154}.         Client specific details:  ***.

## 2023-11-08 NOTE — GROUP NOTE
Psychoeducation Group Documentation    PATIENT'S NAME: Nick Vargas  MRN:   [id number]  :   1965  ACCT. NUMBER: [id number]  DATE OF SERVICE: 23  START TIME:  8:30 AM  END TIME:  9:30 AM  FACILITATOR(S): Jose Carroll LADC; Fabio Ngo LADC; Tg Crow LADC  TOPIC: BEH Pyschoeducation  Number of patients attending the group:  28  Group Length:  1 Hours    Skills Group Therapy Type: Recovery skills and Emotion regulation skills    Summary of Group / Topics Discussed:    Balanced lifestyle skills and Symptom management skills        Group Attendance:  Attended group session    Patient's response to the group topic/interactions:  cooperative with task and listened actively    Patient appeared to be Attentive.         Client specific details:  Nick gave appropriate feedback. .

## 2023-11-08 NOTE — PROGRESS NOTES
[address] [place] [address]        Nick Vargas, 1965, was admitted for evaluation/treatment of a Substance Use Disorder at [facility].  This person took part in these program(s):    ______ The Inpatient Program   ______ The Outpatient Program   ___X___ The Lodging Plus Program   ______ Lodging Day Outpatient       Date admitted: 10/12/23  Date discharged: 11/9/23    Type of discharge:   ______ Satisfactory - completed evaluation / treatment   ______ Discharged without completing   ______ Behavioral discharge   ______ Transferred to another chemical dependency program   ______ Transferred to another type of service   ______ Left against medical advice (AMA) / Eloped       Comments:       Counselor: NOE Nash                       Date: 11/8/2023             Time: 6:33 AM

## 2023-11-08 NOTE — GROUP NOTE
Group Therapy Documentation    PATIENT'S NAME: Nick Vargas  MRN:   [id number]  :   1965  ACCT. NUMBER: [id number]  DATE OF SERVICE: 23  START TIME: 10:40 AM  END TIME: 11:20 AM  FACILITATOR(S): Jose Carroll LADC  TOPIC: BEH Group Therapy  Number of patients attending the group: 12  Group Length:  2 Hours    Group Therapy Type: Recovery strategies and Emotion processing    Summary of Group / Topics Discussed:    Recovery Principles, Sober coping skills, Relationship/socialization, Balanced lifestyle, Disease of addiction, Emotions/expression, and Relapse prevention      Group Attendance:  Attended group session    Patient's response to the group topic/interactions:  cooperative with task, discussed personal experience with topic, expressed readiness to alter behaviors, expressed understanding of topic, gave appropriate feedback to peers, and listened actively    Patient appeared to be Actively participating, Attentive, and Engaged.        Client specific details:  Nick gave appropriate feedback. .

## 2023-11-08 NOTE — GROUP NOTE
Group Therapy Documentation    PATIENT'S NAME: Nick Vargas  MRN:   [id number]  :   1965  ACCT. NUMBER: [id number]  DATE OF SERVICE: 23  START TIME: 12:30 PM  END TIME:  2:30 PM  FACILITATOR(S): Tracey Villa LADC  TOPIC: BEH Group Therapy  Number of patients attending the group:  11  Group Length:  2 Hours    Group Therapy Type: Recovery strategies, Emotion processing, and Daily living/independence skills    Summary of Group / Topics Discussed:    Recovery Principles, Sober coping skills, Relationship/socialization, and Relapse prevention      Group Attendance:  Attended group session    Patient's response to the group topic/interactions:  cooperative with task    Patient appeared to be Actively participating, Attentive, and Engaged.        Client specific details:  Patient attended group session and was attentive and participative.

## 2023-11-08 NOTE — PROGRESS NOTES
[facility] Discharge Summary/Instructions    Patient:  Nick Vargas    MRN: [id number]  : 1965 Age: 58 year old Sex: male   -   Focus of Treatment / Discharge Recommendations   Personal Safety/ Management of Symptoms   * Follow your safety plan. Report increased symptoms to your care team and /or go to the nearest Emergency Department.   * Call crisis lines as needed   [facility] [phone] [facility] [phone]   [facility] [phone] Crisis Connection [phone]   [facility] [phone] [facility] [phone]   [facility] [phone] National Suicide Prevention [phone]   [facility] [phone] Suicide Prevention [phone]   [facility] [phone]   Abstinence/Relapse Prevention   * Take all medicines as directed. Carry a current list of medicines with you.   * Use coping skills: Use peer support group,continue to attend 12 step meetings.Obtain and work with sponsor.   * Do not use illicit (street) drugs, controlled substances (narcotics) or alcohol.   Develop/Improve Independent Living/Socialization Skills: Continue to build relationship with family and sober support network.  Community Resources/Supports and Discharge Planning: Attend three 12 step meetings per week.Obtain sponsor.Attend Phase 2 on Thurs at 5:30pm   Follow up with psychiatrist / main caregiver: DAISY Next visit: TBD   Follow up with your therapist: N/A Next visit: N/A   Go to group therapy and / or support groups at: Phase 2 and 12 step meetings by your home.   See your medical doctor about: N/A   Other:   Client Signature:_______________________ Date / Time:___________   Staff Signature:________________________ Date / Time:___________

## 2023-11-09 DIAGNOSIS — Z12.11 COLON CANCER SCREENING: ICD-10-CM

## 2023-11-09 NOTE — PROGRESS NOTES
"CHEMICAL DEPENDENCY DISCHARGE SUMMARY      PATIENT NAME: Nick Vargas  : 1965     EVALUATION COUNSELOR: NOE Ambriz   TREATMENT COUNSELORS:  JHONATHAN Betts, [facility]: NOE Fish  REFERRAL SOURCE:  Self  PROGRAM: [place].   ADMISSION DATE: 10/12/23  LAST SESSION DATE: 23  DISCHARGE DATE: 23     ADMISSION DIAGNOSES:   Alcohol Use Disorder, Severe 303.90 (F10.20)      DISCHARGE DIAGNOSES:   Alcohol Use Disorder, Severe 303.90 (F10.20)      DISCHARGE STATUS:  The patient successfully completed program with staff approval.    LAST USE DATE: 10/9/23  DAYS OF TREATMENT COMPLETED: 28     PRESENTING INFORMATION: Per ED note from 10/9/23: \"Nick Vargas is a 58 year old male with history of longstanding alcohol use disorder (x30 years) who presents seeking detox from alcohol.  He drinks over 12 bottles of beer a day.  He does not know if he ever gets alcohol withdrawal seizures or DTs, has never been sober long enough to develop withdrawal symptoms.  He has been seen in [facility] internal medicine clinic in regards to helping him with sobriety.  He had suffered a fall and was brought in for evaluation of this by his sister, we did try to obtain [facility] records for this but unsuccessful.  His drinking has gotten bad enough where he has lost his job and he has been drinking at home.  Recently he underwent intervention at his neighbor's house with a  present. Patient states that he drinks at least a case of beer, approximately 24, daily.  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  He has been drinking at home off of save money for the last month or so.  He is afraid of having seizures.  He denies a personal history of seizures but states he has never tried to get sober to the point where he feels like he would have had time to " "go into withdrawal.  He denies other substances.  He denies significant medical concerns.  Was recently evaluated at [facility] after an ED visit after a fall while intoxicated.  This event led to a push to move toward sobriety.\"     READMITTANCE INFORMATION: If additional substance use treatment is required, patient may re-admit into the [facility] program following 30 days from date of discharge.     SERVICES PROVIDED:  Services included assessment, treatment planning, and education regarding chemical dependency, mental illness, relationships, and relapse prevention.  The patient also participated in individual therapy, group therapy, recovery oriented workshops, spiritual care counseling, recovery skills training, and aftercare planning.     ISSUES ADDRESSED IN TREATMENT:   DIMENSION 1:  ACUTE INTOXICATION AND WITHDRAWAL   ADMISSION RISK RATIN  DISCHARGE RISK RATIN  Patient reports last use date was 10/9/23. Patient completed medical detoxification while admitted to unit 3A. Patient was able to tolerate mild withdrawal symptoms throughout treatment and participate in programming. No concerns at time of discharge.      DIMENSION 2:  BIOMEDICAL    ADMISSION RISK RATIN  DISCHARGE RISK RATIN  Patient denied any biomedical concerns. Patient maintained medication compliance and appears able to access medical aid independently.       DIMENSION 3:  EMOTIONAL/BEHAVIORAL  ADMISSION RISK RATIN  DISCHARGE RISK RATIN  Patient denied having any mental health diagnoses.  Upon admission, patient's PHQ-9 initial score was 11/27, indicating moderate depression.  Patient's MARLEY-7 initial score was 9/21, indicating mild anxiety. Patient participated in a suicide risk screening as part of the CD assessment and was given a rating of Very Low Risk. Upon entering treatment, patient reported struggling with grief and loss over the deaths of his mother and father. Patient worked to develop healthy coping " "skills and express emotion in healthy ways. He achieved this by completing the \"Grief and Loss\" assignment. Patient completed a safety plan upon entering the [facility]. Upon discharge, patient denied any suicidal thoughts or ideations.     DIMENSION 4:  READINESS FOR CHANGE  ADMISSION RISK RATIN  DISCHARGE RISK RATIN  Patient has limited insight or acceptance of the first step and how his substance use has affected his life. Patient worked to increase his internal motivation for change and gain an understanding of his disease while at . He completed the assignments \"First Step\" and \"Drug Use History\". He appears to be in the contemplation stage of change at this time.       DIMENSION 5:  RELAPSE AND CONTINUED USE POTENTIAL   ADMISSION RISK RATIN  DISCHARGE RISK RATING: 3  Upon admission, patient had limited insight into his personal relapse process, triggers, and warning signs. Patient had never attempted to get sober in the past either independently or from receiving treatment. Patient worked to develop sober coping skills and a healthy routine while at [facility]. He completed the assignments \"Living Sober\", \"Putting it All Together: Relapse Prevention Plan\", and \"Identifying Relapse Triggers and Cues\". Patient also participated in two weekend workshops on Relapse Prevention and gained insight into the emotional, mental, and physical cues that precede relapse. Patient was able to verbalize sober coping skills and relapse prevention strategies prior to discharge.      DIMENSION 6:  RECOVERY ENVIRONMENT  ADMISSION RISK RATIN  DISCHARGE RISK RATING: 3  Patient lives alone and has been largely isolated. Patient developed sober connections with his peers while at [facility]. Patient was referred to and accepted to Phase II at [facility]. Prior to discharge, patient identified supportive resources in the community that include weekly 12-step meetings and sponsorship.   "   STRENGTHS: Patient is kind, receptive, and willing. He demonstrated great insight for this being his first treatment.     PROGNOSIS:  Prognosis is favorable if patient follows all aftercare recommendations and referrals.      LIVING ARRANGEMENTS AT DISCHARGE: Returning to his private home.     CONTINUING CARE RECOMMENDATIONS AND REFERRALS:   1.  Abstain from all mood-altering chemicals.   2.  Attend a minimum of three 12-step meetings per week.   3.  Obtain a sponsor and maintain regular contact with him.   4.  Complete aftercare at Phase II and follow all recommendations.  5.  Monitor and comply with advice of doctors regarding physical health issues and take all medications as prescribed.   6.  Continue to invest in building a sober support network.   7.  Continue to monitor and gain understanding of relapse triggers and stressors through the use of development of healthy coping skills.         This information has been disclosed to you from records protected by Federal confidentiality rules (42 CFR part 2). The Federal rules prohibit you from making any further disclosure of this information unless further disclosure is expressly permitted by the written consent of the person to whom it pertains or as otherwise permitted by 42 CFR part 2. A general authorization for the release of medical or other information is NOT sufficient for this purpose. The Federal rules restrict any use of the information to criminally investigate or prosecute any alcohol or drug abuse patient.

## 2023-11-16 ENCOUNTER — HOSPITAL ENCOUNTER (OUTPATIENT)
Dept: BEHAVIORAL HEALTH | Facility: CLINIC | Age: 58
Discharge: HOME | End: 2023-11-16
Attending: FAMILY MEDICINE
Payer: COMMERCIAL

## 2023-11-16 PROCEDURE — H2035 A/D TX PROGRAM, PER HOUR: HCPCS | Mod: HQ

## 2023-11-17 NOTE — GROUP NOTE
Group Therapy Documentation    PATIENT'S NAME: Nick Vargas  MRN:   [id number]  :   1965  ACCT. NUMBER: [id number]  DATE OF SERVICE: 23  START TIME:  5:30 PM  END TIME:  7:00 PM  FACILITATOR(S): Franco Denis LADC  TOPIC: BEH Group Therapy  Number of patients attending the group:  6  Group Length:  1.5 Hours    Group Therapy Type: Psychoeducation and Skills/Education    Summary of Group / Topics Discussed:    Mindfulness and Values-Based Living      Group Attendance:  Attended group session    Patient's response to the group topic/interactions:  cooperative with task    Patient appeared to be Actively participating.        Client specific details:  Pt participated in psychoeducation on mindfulness, values-based living, and how mindfulness practices give individuals the resources to engage their values at a higher level. Pt participated in a group exercise to clarify his top 6 values..

## 2023-11-17 NOTE — PROGRESS NOTES
Patient:  Nick Vargas            Adult CD Progress Note and Treatment Plan Review     Attendance  Please refer to OP BEH CD Adult Attendance Record Documentation Flowsheet    Support group attended this week: yes    Reporting sobriety:  yes    Treatment Plan     Treatment Plan Review competed on:    11/16/23    Client preferred learning style: Visual; Hands on; Verbal; Demonstration     Staff Members contributing: NOE Arias                    Received Supervision: No    Client: contributed to goals and plan.    Client received copy of plan/revised plan: Yes    Client agrees with plan/revised plan: Yes    Changes to Treatment Plan: No    New Goals added since last review: None    Goals worked on since last review: See ASAM notes below.    Strategies effective: yes    Strategies need these changes: Continue working on above goals.     ASAM Risk Rating:    Dimension 1 0 No problems.    Dimension 2 0 No problems.    Dimension 3 1 Pt denies any formal MH dx and assessed anxiety and depression levels while at [facility] were low to moderate. Pt has limited insight.    Dimension 4 1 Pt was very attentive and participative in group, indicating good motivation.    Dimension 5 3 Pt verbalizes not knowing how to stay sober or sometimes lacking hope that he can. Counselor to help Pt clarify his RPP and follow it. He is focusing in group in learning mindfulness meditations and strategies to support values-based living.    Dimension 6 3 Pt lives alone and has been largely isolated during his use. He identifies some support in the 12 step community. More sober support is needed, as well as more involvement in structured and meaningful activity.    Any changes in Vulnerable Adult Status?  No  If yes, add to treatment plan and individual abuse prevention plan.    Family Involvement:   None scheduled    Data:   Pt read his top 6 values to the group that he worked on during a group exercise on clarifying values    Intervention:    Staff facilitated group and pt actively participated and offered fellow peers feedback.    Assessment:   Pt appears motivated. His insight appears to be limited, and task-oriented approaches may be most effective.    Plan:  Focus on recovery environment  Monitor emotional/physical health      NOE Arias

## 2023-11-23 ENCOUNTER — LAB (OUTPATIENT)
Dept: FAMILY MEDICINE | Facility: CLINIC | Age: 58
End: 2023-11-23
Payer: COMMERCIAL

## 2023-11-23 DIAGNOSIS — Z12.11 COLON CANCER SCREENING: ICD-10-CM

## 2023-11-30 ENCOUNTER — HOSPITAL ENCOUNTER (OUTPATIENT)
Dept: BEHAVIORAL HEALTH | Facility: CLINIC | Age: 58
Discharge: HOME | End: 2023-11-30
Attending: FAMILY MEDICINE
Payer: COMMERCIAL

## 2023-11-30 PROCEDURE — H2035 A/D TX PROGRAM, PER HOUR: HCPCS | Mod: HQ

## 2023-12-01 NOTE — PROGRESS NOTES
Patient:  Nick Vargas            Adult CD Progress Note and Treatment Plan Review     Attendance  Please refer to OP BEH CD Adult Attendance Record Documentation Flowsheet    Support group attended this week: yes    Reporting sobriety:  yes    Treatment Plan     Treatment Plan Review competed on:    11/16/23    Client preferred learning style: Visual; Hands on; Verbal; Demonstration     Staff Members contributing: NOE Arias                    Received Supervision: No    Client: contributed to goals and plan.    Client received copy of plan/revised plan: Yes    Client agrees with plan/revised plan: Yes    Changes to Treatment Plan: No    New Goals added since last review: None    Goals worked on since last review: See ASAM notes below.    Strategies effective: yes    Strategies need these changes: Continue working on above goals.     ASAM Risk Rating:    Dimension 1 0 No problems.    Dimension 2 0 No problems.    Dimension 3 1 Pt denies any formal MH dx and assessed anxiety and depression levels while at [facility] were low to moderate. Pt has limited insight. Collateral indicates Pt has a TBI.    Dimension 4 1 Pt was again very attentive and participative in group. He completed his assignment and brought it to group.    Dimension 5 3 Pt and counselor are working together to build skills, such as mindfulness and meditation, to support values-based living and foster a serene sober life.    Dimension 6 3 Pt lives alone with his dog and was highly isolated. Very important that Pt has a lot of involvement in the recovery community.    Any changes in Vulnerable Adult Status?  No  If yes, add to treatment plan and individual abuse prevention plan.    Family Involvement:   None scheduled    Data:   Pt participated in a reading on mindfulness and a discussion after    Intervention:   Staff facilitated group and pt actively participated and offered fellow peers feedback.    Assessment:   Pt appears motivated and is trying  his best to support his recovery.    Plan:  Focus on recovery environment  Monitor emotional/physical health      NOE Arias

## 2023-12-01 NOTE — GROUP NOTE
Group Therapy Documentation    PATIENT'S NAME: Nick Vargas  MRN:   [id number]  :   1965  ACCT. NUMBER: [id number]  DATE OF SERVICE: 23  START TIME:  5:30 PM  END TIME:  7:00 PM  FACILITATOR(S): Franco Denis LADC  TOPIC: BEH Group Therapy  Number of patients attending the group:  7  Group Length:  1.5 Hours    Group Therapy Type: Psychoeducation and Skills/Education    Summary of Group / Topics Discussed:    Mindfulness and Meditation, Attention, and Values-based living      Group Attendance:  Attended group session    Patient's response to the group topic/interactions:  cooperative with task    Patient appeared to be Actively participating.        Client specific details:  Pt engaged in reading on mindfulness and discussion afterward. Reading dealt with meditation and mindfulness exercises building attention, which can thereby support values-based living and foster a serene sober life..

## 2023-12-14 ENCOUNTER — HOSPITAL ENCOUNTER (OUTPATIENT)
Dept: BEHAVIORAL HEALTH | Facility: CLINIC | Age: 58
Discharge: HOME | End: 2023-12-14
Attending: FAMILY MEDICINE
Payer: COMMERCIAL

## 2023-12-14 PROCEDURE — H2035 A/D TX PROGRAM, PER HOUR: HCPCS | Mod: HQ

## 2023-12-15 NOTE — GROUP NOTE
Group Therapy Documentation    PATIENT'S NAME: Nick Vargas  MRN:   [id number]  :   1965  ACCT. NUMBER: [id number]  DATE OF SERVICE: 23  START TIME:  5:30 PM  END TIME:  7:00 PM  FACILITATOR(S): Franco Denis LADC  TOPIC: BEH Group Therapy  Number of patients attending the group:  5  Group Length:  1.5 Hours    Group Therapy Type: Life skill(s)    Summary of Group / Topics Discussed:    Coping Skills/Lifestyle Managemet, Mindfulness, and Values-based living      Group Attendance:  Attended group session    Patient's response to the group topic/interactions:  cooperative with task    Patient appeared to be Actively participating.        Client specific details:  Pt was receptive to group topics and actively participated.

## 2023-12-15 NOTE — PROGRESS NOTES
Patient:  Nick Vargas            Adult CD Progress Note and Treatment Plan Review     Attendance  Please refer to OP BEH CD Adult Attendance Record Documentation Flowsheet    Support group attended this week: yes    Reporting sobriety:  yes    Treatment Plan     Treatment Plan Review competed on:    12/14/23    Client preferred learning style: Visual; Hands on; Verbal; Demonstration     Staff Members contributing: NOE Arias                    Received Supervision: No    Client: contributed to goals and plan.    Client received copy of plan/revised plan: Yes    Client agrees with plan/revised plan: Yes    Changes to Treatment Plan: No    New Goals added since last review: None    Goals worked on since last review: See ASAM notes below.    Strategies effective: yes    Strategies need these changes: Continue working on above goals.     ASAM Risk Rating:    Dimension 1 0 No problems.    Dimension 2 0 No problems.    Dimension 3 1 Pt denies any formal MH dx and assessed anxiety and depression levels while at [facility] were low to moderate. Pt has limited insight. Collateral indicates Pt has a TBI.    Dimension 4 1 Pt is attentive and participative.    Dimension 5 3 Pt is working to build mindfulness through meditation and other strategies, and to identify his personal values and live them throughout his day.    Dimension 6 3 Pt lives alone with his dog and was highly isolated. Very important that Pt has a lot of involvement in the recovery community. Pt needs to be encouraged to attend meetings and obtain a sponsor.    Any changes in Vulnerable Adult Status?  No  If yes, add to treatment plan and individual abuse prevention plan.    Family Involvement:   None scheduled    Data:   Pt participated in discussion and meditation.    Intervention:   Staff facilitated group and pt actively participated and offered fellow peers feedback.    Assessment:   Pt appears motivated and is trying his best to support his  recovery.    Plan:  Focus on recovery environment  Monitor emotional/physical health      NOE Arias

## 2023-12-21 ENCOUNTER — HOSPITAL ENCOUNTER (OUTPATIENT)
Dept: BEHAVIORAL HEALTH | Facility: CLINIC | Age: 58
Discharge: HOME | End: 2023-12-21
Attending: FAMILY MEDICINE
Payer: COMMERCIAL

## 2023-12-21 PROCEDURE — H2035 A/D TX PROGRAM, PER HOUR: HCPCS | Mod: HQ

## 2023-12-22 NOTE — GROUP NOTE
Group Therapy Documentation    PATIENT'S NAME: Nick Vargas  MRN:   [id number]  :   1965  ACCT. NUMBER: [id number]  DATE OF SERVICE: 23  START TIME:  5:30 PM  END TIME:  7:00 PM  FACILITATOR(S): Franco Denis LADC  TOPIC: BEH Group Therapy  Number of patients attending the group:  6  Group Length:  1.5 Hours    Group Therapy Type: Psychoeducation    Summary of Group / Topics Discussed:    First step, condition of internal unmanageability, techniques of building self-esteem and serenity      Group Attendance:  Attended group session    Patient's response to the group topic/interactions:  cooperative with task    Patient appeared to be Actively participating.        Client specific details:  Pt was attentive and participative.

## 2023-12-22 NOTE — PROGRESS NOTES
Patient:  Nick Vargas            Adult CD Progress Note and Treatment Plan Review     Attendance  Please refer to OP BEH CD Adult Attendance Record Documentation Flowsheet    Support group attended this week: yes    Reporting sobriety:  yes    Treatment Plan     Treatment Plan Review competed on:    12/21/23    Client preferred learning style: Visual; Hands on; Verbal; Demonstration     Staff Members contributing: NOE Arias                    Received Supervision: No    Client: contributed to goals and plan.    Client received copy of plan/revised plan: Yes    Client agrees with plan/revised plan: Yes    Changes to Treatment Plan: No    New Goals added since last review: None    Goals worked on since last review: See ASAM notes below.    Strategies effective: yes    Strategies need these changes: Continue working on above goals.     ASAM Risk Rating:    Dimension 1 0 No problems.    Dimension 2 0 No problems.    Dimension 3 1 Pt denies any formal MH dx and assessed anxiety and depression levels while at [facility] were low to moderate. Pt has limited insight. Collateral indicates Pt has a TBI.    Dimension 4 1 Pt is participative and positive.    Dimension 5 3 More time needed to assess. It is difficult at times to determine what Pt is and is not absorbing.    Dimension 6 3 Pt lives alone with his dog and was highly isolated. Very important that Pt has a lot of involvement in the recovery community. Pt needs to be encouraged to attend meetings and obtain a sponsor.    Any changes in Vulnerable Adult Status?  No  If yes, add to treatment plan and individual abuse prevention plan.    Family Involvement:   None scheduled    Data:   Psycho-ed session given.    Intervention:   Staff facilitated group and pt actively participated and offered fellow peers feedback.    Assessment:   It can be difficult to determine how Pt is absorbing information and material. A brief individual session at the next meeting will be  helpful.    Plan:  Focus on recovery environment  Monitor emotional/physical health      NOE Arias

## 2024-01-04 ENCOUNTER — HOSPITAL ENCOUNTER (OUTPATIENT)
Dept: BEHAVIORAL HEALTH | Facility: CLINIC | Age: 59
Discharge: HOME | End: 2024-01-04
Attending: FAMILY MEDICINE
Payer: COMMERCIAL

## 2024-01-04 PROCEDURE — H2035 A/D TX PROGRAM, PER HOUR: HCPCS | Mod: HQ

## 2024-01-05 NOTE — PROGRESS NOTES
Patient:  Nick Vargas            Adult CD Progress Note and Treatment Plan Review     Attendance  Please refer to OP BEH CD Adult Attendance Record Documentation Flowsheet    Support group attended this week: yes    Reporting sobriety:  yes    Treatment Plan     Treatment Plan Review competed on:    1/4/24    Client preferred learning style: Visual; Hands on; Verbal; Demonstration     Staff Members contributing: NOE Arias                    Received Supervision: No    Client: contributed to goals and plan.    Client received copy of plan/revised plan: Yes    Client agrees with plan/revised plan: Yes    Changes to Treatment Plan: No    New Goals added since last review: None    Goals worked on since last review: See ASAM notes below.    Strategies effective: yes    Strategies need these changes: Continue working on above goals.     ASAM Risk Rating:    Dimension 1 0 No problems.    Dimension 2 0 No problems.    Dimension 3 1 Pt denies any formal MH dx and assessed anxiety and depression levels while at [facility] were low to moderate. Pt has limited insight. Collateral indicates Pt has a TBI.    Dimension 4 1 Pt continues to be participative and is regularly at P2.     Dimension 5 3 Pt is absorbing as much as he can about the group work on mindfulness and values-based living to improve life, serenity, and risk of relapse.    Dimension 6 3 Pt lives alone with his dog and was highly isolated. Remains very important to build and engage sober community.    Any changes in Vulnerable Adult Status?  No  If yes, add to treatment plan and individual abuse prevention plan.    Family Involvement:   None scheduled    Data:   Pt participated in workshop on wise acceptance    Intervention:   Staff facilitated group and pt actively participated and offered fellow peers feedback.    Assessment:   Pt is doing his best to attend to his recovery    Plan:  Focus on recovery environment  Monitor emotional/physical health      Kenny  Cira [facility]

## 2024-01-05 NOTE — GROUP NOTE
Group Therapy Documentation    PATIENT'S NAME: Nick Vargas  MRN:   [id number]  :   1965  ACCT. NUMBER: [id number]  DATE OF SERVICE: 24  START TIME:  5:30 PM  END TIME:  7:00 PM  FACILITATOR(S): Franco Denis LADC  TOPIC: BEH Group Therapy  Number of patients attending the group:  4  Group Length:  1.5 Hours    Group Therapy Type: Life skill(s) and Psychoeducation    Summary of Group / Topics Discussed:    Wise acceptance      Group Attendance:  Attended group session    Patient's response to the group topic/interactions:  cooperative with task    Patient appeared to be Actively participating.        Client specific details:  Pt participated in workshop on wise acceptance.

## 2024-01-11 ENCOUNTER — HOSPITAL ENCOUNTER (OUTPATIENT)
Dept: BEHAVIORAL HEALTH | Facility: CLINIC | Age: 59
Discharge: HOME | End: 2024-01-11
Attending: FAMILY MEDICINE
Payer: COMMERCIAL

## 2024-01-11 PROCEDURE — H2035 A/D TX PROGRAM, PER HOUR: HCPCS | Mod: HQ

## 2024-01-12 NOTE — PROGRESS NOTES
Patient:  Nick Vargas            Adult CD Progress Note and Treatment Plan Review     Attendance  Please refer to OP BEH CD Adult Attendance Record Documentation Flowsheet    Support group attended this week: yes    Reporting sobriety:  yes    Treatment Plan     Treatment Plan Review competed on:    1/11/24    Client preferred learning style: Visual; Hands on; Verbal; Demonstration     Staff Members contributing: NOE Arias                    Received Supervision: No    Client: contributed to goals and plan.    Client received copy of plan/revised plan: Yes    Client agrees with plan/revised plan: Yes    Changes to Treatment Plan: No    New Goals added since last review: None    Goals worked on since last review: See ASAM notes below.    Strategies effective: yes    Strategies need these changes: Continue working on above goals.     ASAM Risk Rating:    Dimension 1 0 No problems.    Dimension 2 0 No problems.    Dimension 3 1 Pt denies any formal MH dx and assessed anxiety and depression levels while at LP were low to moderate. Pt has limited insight. Collateral indicates Pt has a TBI.    Dimension 4 1 Pt continues to be motivated and regularly attend P2.    Dimension 5 3 Pt is continuing to practice and learn skills for living sober.    Dimension 6 3 Pt lives alone with his dog and was highly isolated. Remains very important to build and engage sober community.    Any changes in Vulnerable Adult Status?  No  If yes, add to treatment plan and individual abuse prevention plan.    Family Involvement:   None scheduled    Data:   CBT workshop given tonight. Pts also engaged in a meditation.    Intervention:   Staff facilitated group and pt actively participated and offered fellow peers feedback.    Assessment:   Pt continues to do his best to attend to his recovery    Plan:  Focus on recovery environment  Monitor emotional/physical health      NOE Arias

## 2024-01-12 NOTE — GROUP NOTE
Group Therapy Documentation    PATIENT'S NAME: Nick Vargas  MRN:   [id number]  :   1965  ACCT. NUMBER: [id number]  DATE OF SERVICE: 24  START TIME:  5:30 PM  END TIME:  7:00 PM  FACILITATOR(S): Franco Denis LADC  TOPIC: BEH Group Therapy  Number of patients attending the group:  5  Group Length:  1.5 Hours    Group Therapy Type: Life skill(s) and Psychoeducation    Summary of Group / Topics Discussed:    Cognitive Therapy Techniques, Meditation/Breathing Exercises, and Mindfulness      Group Attendance:  Attended group session    Patient's response to the group topic/interactions:  cooperative with task    Patient appeared to be Actively participating.        Client specific details:  Pt participated on a CBT workshop and also a group meditation.

## 2024-01-25 ENCOUNTER — HOSPITAL ENCOUNTER (OUTPATIENT)
Dept: BEHAVIORAL HEALTH | Facility: CLINIC | Age: 59
Discharge: HOME | End: 2024-01-25
Attending: FAMILY MEDICINE
Payer: COMMERCIAL

## 2024-01-25 PROCEDURE — H2035 A/D TX PROGRAM, PER HOUR: HCPCS | Mod: HQ

## 2024-01-26 ENCOUNTER — TELEPHONE (OUTPATIENT)
Dept: BEHAVIORAL HEALTH | Facility: CLINIC | Age: 59
End: 2024-01-26
Payer: COMMERCIAL

## 2024-01-26 NOTE — TELEPHONE ENCOUNTER
----- Message from Peter Nobles [facility] sent at 1/26/2024  8:00 AM CST -----  Regarding: FW: Pls Schedule Additional Sessions    ----- Message -----  From: Franco Denis [facility]  Sent: 1/25/2024   7:11 PM CST  To: [facility]  Subject: Pls Schedule Additional Sessions                 Hello,    Please schedule this Pt for 4 additional sessions of Phase 2, group B2, group code [id number] beginning 1/25/24.    Thanks!    Kenny

## 2024-02-01 ENCOUNTER — HOSPITAL ENCOUNTER (OUTPATIENT)
Dept: BEHAVIORAL HEALTH | Facility: CLINIC | Age: 59
Discharge: HOME | End: 2024-02-01
Attending: FAMILY MEDICINE
Payer: COMMERCIAL

## 2024-02-01 PROCEDURE — H2035 A/D TX PROGRAM, PER HOUR: HCPCS | Mod: HQ

## 2024-02-02 NOTE — GROUP NOTE
Group Therapy Documentation    PATIENT'S NAME: Nick Vargas  MRN:   [id number]  :   1965  ACCT. NUMBER: [id number]  DATE OF SERVICE: 24  START TIME:  5:30 PM  END TIME:  7:00 PM  FACILITATOR(S): Franco Denis LADC  TOPIC: BEH Group Therapy  Number of patients attending the group:  5  Group Length:  1.5 Hours    Group Therapy Type: Psychoeducation, discussion, skills    Summary of Group / Topics Discussed:    Mindfulness and equanimity, noticing good qualities in others      Group Attendance:  Attended group session    Patient's response to the group topic/interactions:  cooperative with task    Patient appeared to be Actively participating.        Client specific details:  Pt participated actively.

## 2024-02-02 NOTE — PROGRESS NOTES
Patient:  Nick Vargas            Adult CD Progress Note and Treatment Plan Review     Attendance  Please refer to OP BEH CD Adult Attendance Record Documentation Flowsheet    Support group attended this week: yes    Reporting sobriety:  yes    Treatment Plan     Treatment Plan Review competed on:    2/1/24    Client preferred learning style: Visual; Hands on; Verbal; Demonstration     Staff Members contributing: NOE Arias                    Received Supervision: No    Client: contributed to goals and plan.    Client received copy of plan/revised plan: Yes    Client agrees with plan/revised plan: Yes    Changes to Treatment Plan: No    New Goals added since last review: None    Goals worked on since last review: See ASAM notes below.    Strategies effective: yes    Strategies need these changes: Continue working on above goals.     ASAM Risk Rating:    Dimension 1 0 No problems.    Dimension 2 0 No problems.    Dimension 3 1 Pt denies any formal MH dx and assessed anxiety and depression levels while at LP were low to moderate. Pt has limited insight. Collateral indicates Pt has a TBI.    Dimension 4 1 Pt is motivated and always present at P2. He is also isolative outside of P2.    Dimension 5 3 Pt is continuing to practice and learn skills for living sober. Needs to improve on his RPP.    Dimension 6 3 Pt continues to not engage a recovery community much. Support is limited.    Any changes in Vulnerable Adult Status?  No  If yes, add to treatment plan and individual abuse prevention plan.    Family Involvement:   None scheduled    Data:   Pt participated in a workshop on mindfulness, equanimity, and noticing the good qualities in others.    Intervention:   Staff facilitated group and pt actively participated and offered fellow peers feedback.    Assessment:   Pt is participative in P2 and engages. Needs more involvement in sober community outside of P2.    Plan:  Focus on recovery environment  Monitor  emotional/physical health      VINCE AriasC

## 2024-02-02 NOTE — GROUP NOTE
Group Therapy Documentation    PATIENT'S NAME: Nick Vargas  MRN:   [id number]  :   1965  ACCT. NUMBER: [id number]  DATE OF SERVICE: 24  START TIME:  5:30 PM  END TIME:  7:00 PM  FACILITATOR(S): Franco Denis LADC  TOPIC: BEH Group Therapy  Number of patients attending the group:  5  Group Length:  1.5 Hours    Group Therapy Type: Life skill(s) and Psychoeducation    Summary of Group / Topics Discussed:    Cognitive Therapy Techniques, Meditation/Breathing Exercises, and Mindfulness      Group Attendance:  Attended group session    Patient's response to the group topic/interactions:  cooperative with task    Patient appeared to be Actively participating.        Client specific details:  Pt was actively participating and gave good feedback     No

## 2024-02-02 NOTE — PROGRESS NOTES
Patient:  Nick Vargas            Adult CD Progress Note and Treatment Plan Review     Attendance  Please refer to OP BEH CD Adult Attendance Record Documentation Flowsheet    Support group attended this week: yes    Reporting sobriety:  yes    Treatment Plan     Treatment Plan Review competed on:    1/25/24    Client preferred learning style: Visual; Hands on; Verbal; Demonstration     Staff Members contributing: NOE Arias                    Received Supervision: No    Client: contributed to goals and plan.    Client received copy of plan/revised plan: Yes    Client agrees with plan/revised plan: Yes    Changes to Treatment Plan: No    New Goals added since last review: None    Goals worked on since last review: See ASAM notes below.    Strategies effective: yes    Strategies need these changes: Continue working on above goals.     ASAM Risk Rating:    Dimension 1 0 No problems.    Dimension 2 0 No problems.    Dimension 3 1 Pt denies any formal MH dx and assessed anxiety and depression levels while at LP were low to moderate. Pt has limited insight. Collateral indicates Pt has a TBI.    Dimension 4 1 Pt is motivated and always present at P2.    Dimension 5 3 Pt is continuing to practice and learn skills for living sober. There are certain portions of his RPP, such as meeting attendance, the steps, and a sponsor, that are lacking.    Dimension 6 3 Pt lives alone with his dog and was highly isolated. Remains very important to build and engage sober community. Needs to obtain a sponsor, go to meetings, and work the steps.    Any changes in Vulnerable Adult Status?  No  If yes, add to treatment plan and individual abuse prevention plan.    Family Involvement:   None scheduled    Data:   Pt was attentive in group and gave good feedback to others    Intervention:   Staff facilitated group and pt actively participated and offered fellow peers feedback.    Assessment:   Pt needs to connect w/ 12 step or similar  community    Plan:  Focus on recovery environment  Monitor emotional/physical health      NOE Arias

## 2024-02-08 ENCOUNTER — HOSPITAL ENCOUNTER (OUTPATIENT)
Dept: BEHAVIORAL HEALTH | Facility: CLINIC | Age: 59
Discharge: HOME | End: 2024-02-08
Attending: FAMILY MEDICINE
Payer: COMMERCIAL

## 2024-02-08 PROCEDURE — H2035 A/D TX PROGRAM, PER HOUR: HCPCS | Mod: HQ

## 2024-02-09 NOTE — GROUP NOTE
Group Therapy Documentation    PATIENT'S NAME: Nick Vargas  MRN:   [id number]  :   1965  ACCT. NUMBER: [id number]  DATE OF SERVICE: 24  START TIME:  5:30 PM  END TIME:  7:00 PM  FACILITATOR(S): Franco Denis LADC  TOPIC: BEH Group Therapy  Number of patients attending the group:  4  Group Length:  1.5 Hours    Group Therapy Type: Psychoeducation and Skills/Education    Summary of Group / Topics Discussed:    Meditation/Breathing Exercises, Mindfulness, and setting aim / focusing on values      Group Attendance:  Attended group session    Patient's response to the group topic/interactions:  cooperative with task    Patient appeared to be Actively participating.        Client specific details:  Discussion tonight on how setting an aim for our life determines how we see and experience the world, and that we therefore need to determine a positive aim (such as growing to live more within our values).

## 2024-02-09 NOTE — PROGRESS NOTES
Patient:  Nick Vargas            Adult CD Progress Note and Treatment Plan Review     Attendance  Please refer to OP BEH CD Adult Attendance Record Documentation Flowsheet    Support group attended this week: yes    Reporting sobriety:  yes    Treatment Plan     Treatment Plan Review competed on:    2/8/24    Client preferred learning style: Visual; Hands on; Verbal; Demonstration     Staff Members contributing: NOE Arias                    Received Supervision: No    Client: contributed to goals and plan.    Client received copy of plan/revised plan: Yes    Client agrees with plan/revised plan: Yes    Changes to Treatment Plan: No    New Goals added since last review: None    Goals worked on since last review: See ASAM notes below.    Strategies effective: yes    Strategies need these changes: Continue working on above goals.     ASAM Risk Rating:    Dimension 1 0 No problems.    Dimension 2 0 No problems.    Dimension 3 1 Pt denies any formal MH dx and assessed anxiety and depression levels while at LP were low to moderate. Pt has limited insight. Collateral indicates Pt has a TBI.    Dimension 4 1 Pt is motivated and always present at P2. He brought up something he read from group material this week, demonstrating that he is engaging with group material outside of group.    Dimension 5 3 Pt participated in psychoeducation and workshop this evening about setting a positive aim for one's life, and how that aim will determine how we see and experience the world. Continued work w/ Pt on values-based living, mindfulness, and meditation.    Dimension 6 3 Pt still has some isolation. More sober support would be good. Some members of the group are trying to engage Pt to take him to meetings, etc.    Any changes in Vulnerable Adult Status?  No  If yes, add to treatment plan and individual abuse prevention plan.    Family Involvement:   None scheduled    Data:   Pt spoke about a reading that he really liked from  last week's material    Intervention:   Staff facilitated group and pt actively participated and offered fellow peers feedback.    Assessment:   Pt continues to be a positive group member. Would like to see Pt engage more sober support and engage 12 step or similar recovery community.    Plan:  Focus on recovery environment  Monitor emotional/physical health      NOE Arias

## 2024-02-15 ENCOUNTER — HOSPITAL ENCOUNTER (OUTPATIENT)
Dept: BEHAVIORAL HEALTH | Facility: CLINIC | Age: 59
Discharge: HOME | End: 2024-02-15
Attending: FAMILY MEDICINE
Payer: COMMERCIAL

## 2024-02-15 PROCEDURE — H2035 A/D TX PROGRAM, PER HOUR: HCPCS | Mod: HQ

## 2024-02-16 NOTE — PROGRESS NOTES
Patient:  Nick Vargas            Adult CD Progress Note and Treatment Plan Review     Attendance  Please refer to OP BEH CD Adult Attendance Record Documentation Flowsheet    Support group attended this week: yes    Reporting sobriety:  yes    Treatment Plan     Treatment Plan Review competed on:    2/15/24    Client preferred learning style: Visual; Hands on; Verbal; Demonstration     Staff Members contributing: NOE Arias                    Received Supervision: No    Client: contributed to goals and plan.    Client received copy of plan/revised plan: Yes    Client agrees with plan/revised plan: Yes    Changes to Treatment Plan: No    New Goals added since last review: None    Goals worked on since last review: See ASAM notes below.    Strategies effective: yes    Strategies need these changes: Continue working on above goals.     ASAM Risk Rating:    Dimension 1 0 No problems.    Dimension 2 0 No problems.    Dimension 3 1 Pt denies any formal MH dx and assessed anxiety and depression levels while at LP were low to moderate. Pt has limited insight. Collateral indicates Pt has a TBI.    Dimension 4 1 Pt is motivated and always present at P2.    Dimension 5 3 Pt participated in psychoeducation and workshop this evening on using CBT strategies to challenge negative thoughts.    Dimension 6 3 Pt is reconnecting with a [employer], which will bring him some good community support. It would be good if Pt would better connect with the sober community. Pt very much likes P2.     Any changes in Vulnerable Adult Status?  No  If yes, add to treatment plan and individual abuse prevention plan.    Family Involvement:   None scheduled    Data:   Pt participated in a CBT exercise on challenging negative thoughts    Intervention:   Staff facilitated group and pt actively participated and offered fellow peers feedback.    Assessment:   Pt is a positive group member and is a regular attendee.    Plan:  Focus on recovery  environment  Monitor emotional/physical health      NOE Arias

## 2024-02-16 NOTE — GROUP NOTE
Group Therapy Documentation    PATIENT'S NAME: Nick Vargas  MRN:   [id number]  :   1965  ACCT. NUMBER: [id number]  DATE OF SERVICE: 2/15/24  START TIME:  5:30 PM  END TIME:  7:00 PM  FACILITATOR(S): Franco Denis LADC  TOPIC: BEH Group Therapy  Number of patients attending the group:  4  Group Length:  1.5 Hours    Group Therapy Type: Life skill(s) and Psychoeducation    Summary of Group / Topics Discussed:    Cognitive Therapy Techniques, Meditation/Breathing Exercises, and Mindfulness      Group Attendance:  Attended group session    Patient's response to the group topic/interactions:  cooperative with task    Patient appeared to be Actively participating.        Client specific details:  Pt was receptive to group topics and participated actively.

## 2024-02-29 ENCOUNTER — HOSPITAL ENCOUNTER (OUTPATIENT)
Dept: BEHAVIORAL HEALTH | Facility: CLINIC | Age: 59
Discharge: HOME | End: 2024-02-29
Attending: FAMILY MEDICINE | Admitting: FAMILY MEDICINE
Payer: COMMERCIAL

## 2024-02-29 PROCEDURE — H2035 A/D TX PROGRAM, PER HOUR: HCPCS | Mod: HQ

## 2024-03-01 NOTE — PROGRESS NOTES
Patient:  Nick Vargas            Adult CD Progress Note and Treatment Plan Review     Attendance  Please refer to OP BEH CD Adult Attendance Record Documentation Flowsheet    Support group attended this week: yes    Reporting sobriety:  yes    Treatment Plan     Treatment Plan Review competed on:    2/29/24    Client preferred learning style: Visual; Hands on; Verbal; Demonstration     Staff Members contributing: NOE Arias                    Received Supervision: No    Client: contributed to goals and plan.    Client received copy of plan/revised plan: Yes    Client agrees with plan/revised plan: Yes    Changes to Treatment Plan: No    New Goals added since last review: None    Goals worked on since last review: See ASAM notes below.    Strategies effective: yes    Strategies need these changes: Continue working on above goals.     ASAM Risk Rating:    Dimension 1 0 No problems.      Dimension 2 0 No problems.      Dimension 3 1 Patient is working towards gaining continued awareness regarding how his substance abuse negatively impacts his mental and emotional well being. Patient reports positive improvements in his mood due to his satisfaction at work. Patient reports no suicidal ideation at this time.       Dimension 4 1 Patient reports that he's motivated by his improved peace of mind and the promise he made to his group peers and counselors to remain sober.      Dimension 5 3 Patient is working to gain continued insight regarding his triggers, cues and early warning signs for relapse. Patient reports having cravings due to working overtime at this job and being reprimanded for it. Patient reports that he's managed his cravings by reading the bible.      Dimension 6 3 Patient reports talking to caregiver at [facility] and further reporting that he'd like to obtain a therapist to assist him deal with his mental and emotional concerns.    Any changes in Vulnerable Adult Status?  No  If yes, add to  treatment plan and individual abuse prevention plan.    Family Involvement:   None scheduled    Data:   Pt participated in a CBT exercise on challenging negative thoughts    Intervention:   Staff facilitated group and pt actively participated and offered fellow peers feedback.    Assessment:   Pt is a positive group member and is a regular attendee.    Plan:  Focus on recovery environment  Monitor emotional/physical health      NOE Teajda

## 2024-03-01 NOTE — GROUP NOTE
Group Therapy Documentation    PATIENT'S NAME: Nick Vargas  MRN:   [id number]  :   1965  ACCT. NUMBER: [id number]  DATE OF SERVICE: 24  START TIME:  5:30 PM  END TIME:  7:00 PM  FACILITATOR(S): Tracey Villa LADC  TOPIC: BEH Group Therapy  Number of patients attending the group:  4    Group Length:  1.5 Hours    Group Therapy Type: Recovery strategies, Emotion processing, Daily living/independence skills, and Health and wellbeing     Summary of Group / Topics Discussed:    Sober coping skills and Relapse prevention      Group Attendance:  Attended group session    Patient's response to the group topic/interactions:  cooperative with task    Patient appeared to be Actively participating, Attentive, and Engaged.        Client specific details:  Patient attended group session and was attentive and participative.

## 2024-03-14 ENCOUNTER — HOSPITAL ENCOUNTER (OUTPATIENT)
Dept: BEHAVIORAL HEALTH | Facility: CLINIC | Age: 59
Discharge: HOME | End: 2024-03-14
Attending: FAMILY MEDICINE
Payer: COMMERCIAL

## 2024-03-14 PROCEDURE — H2035 A/D TX PROGRAM, PER HOUR: HCPCS | Mod: HQ

## 2024-03-15 NOTE — PROGRESS NOTES
Patient:  Nick Vargas            Adult CD Progress Note and Treatment Plan Review     Attendance  Please refer to OP BEH CD Adult Attendance Record Documentation Flowsheet    Support group attended this week: yes    Reporting sobriety:  yes    Treatment Plan     Treatment Plan Review competed on:    3/14/24    Client preferred learning style: Visual; Hands on; Verbal; Demonstration     Staff Members contributing: NOE Arias                    Received Supervision: No    Client: contributed to goals and plan.    Client received copy of plan/revised plan: Yes    Client agrees with plan/revised plan: Yes    Changes to Treatment Plan: No    New Goals added since last review: None    Goals worked on since last review: See ASAM notes below.    Strategies effective: yes    Strategies need these changes: Continue working on above goals.     ASAM Risk Rating:    Dimension 1 0 No problems.    Dimension 2 0 No problems.    Dimension 3 1 Pt denies any formal MH dx and assessed anxiety and depression levels while at LP were low to moderate. Pt has limited insight. Collateral indicates Pt has a TBI.    Dimension 4 1 Pt is motivated and always present at P2.    Dimension 5 3 Tonight Pt participated in a discussion around mindfulness, values, and what does and does not create genuine happiness    Dimension 6 3 Pt mentioned [employer] and his work when giving introduction and indicated that these are going well.    Any changes in Vulnerable Adult Status?  No  If yes, add to treatment plan and individual abuse prevention plan.    Family Involvement:   None scheduled    Data:   Tonight Pt participated in a discussion around mindfulness, values, and what does and does not create genuine happiness    Intervention:   Staff facilitated group and pt actively participated and offered fellow peers feedback.    Assessment:   Pt continues to be a positive group member. He does his best to apply what we learn and teach in  group.    Plan:  Focus on recovery environment  Monitor emotional/physical health      NOE Arias

## 2024-03-15 NOTE — GROUP NOTE
Group Therapy Documentation    PATIENT'S NAME: Nick Vargas  MRN:   [id number]  :   1965  ACCT. NUMBER: [id number]  DATE OF SERVICE: 3/14/24  START TIME:  5:30 PM  END TIME:  7:00 PM  FACILITATOR(S): Franco Denis LADC  TOPIC: BEH Group Therapy  Number of patients attending the group:  4  Group Length:  1.5 Hours    Group Therapy Type: Skills/Education    Summary of Group / Topics Discussed:    Mindfulness and Values      Group Attendance:  Attended group session    Patient's response to the group topic/interactions:  cooperative with task    Patient appeared to be Actively participating.        Client specific details:  Tonight Pt participated in a discussion around mindfulness, values, and what does and does not create genuine happiness.

## 2024-03-21 ENCOUNTER — HOSPITAL ENCOUNTER (OUTPATIENT)
Dept: BEHAVIORAL HEALTH | Facility: CLINIC | Age: 59
Discharge: HOME | End: 2024-03-21
Attending: FAMILY MEDICINE
Payer: COMMERCIAL

## 2024-03-21 PROCEDURE — H2035 A/D TX PROGRAM, PER HOUR: HCPCS | Mod: HQ

## 2024-03-22 NOTE — PROGRESS NOTES
Patient:  Nick Vargas            Adult CD Progress Note and Treatment Plan Review     Attendance  Please refer to OP BEH CD Adult Attendance Record Documentation Flowsheet    Support group attended this week: yes    Reporting sobriety:  yes    Treatment Plan     Treatment Plan Review competed on:    3/21/24    Client preferred learning style: Visual; Hands on; Verbal; Demonstration     Staff Members contributing: NOE Arias                    Received Supervision: No    Client: contributed to goals and plan.    Client received copy of plan/revised plan: Yes    Client agrees with plan/revised plan: Yes    Changes to Treatment Plan: No    New Goals added since last review: None    Goals worked on since last review: See ASAM notes below.    Strategies effective: yes    Strategies need these changes: Continue working on above goals.     ASAM Risk Rating:    Dimension 1 0 No problems.    Dimension 2 0 No problems.    Dimension 3 1 Pt denies any formal MH dx and assessed anxiety and depression levels while at LP were low to moderate. Pt has limited insight. Collateral indicates Pt has a TBI.    Dimension 4 1 Pt is motivated and always present at P2.    Dimension 5 3 Pt identified habits and tendencies that cause himself and others pain, and Pt identified qualities of the person he wants to be. Pt participated in discussion about utilizing mindfulness to disengage these unhealthy habits and seek to embody the qualities he desires for himself.    Dimension 6 3 Pt mentioned [employer] and his work when giving introduction and indicated that these are going well.    Any changes in Vulnerable Adult Status?  No  If yes, add to treatment plan and individual abuse prevention plan.    Family Involvement:   None scheduled    Data:   Pt participated in exercise to identify unhealthy habits and identify qualities he wants to embody.    Intervention:   Staff facilitated group and pt actively participated and offered fellow peers  feedback.    Assessment:   Pt continues to be a positive group member. He does his best to apply what we learn and teach in group.    Plan:  Focus on recovery environment  Monitor emotional/physical health      NOE Arias

## 2024-03-22 NOTE — GROUP NOTE
Group Therapy Documentation    PATIENT'S NAME: Nick Vargas  MRN:   [id number]  :   1965  ACCT. NUMBER: [id number]  DATE OF SERVICE: 3/21/24  START TIME:  5:30 PM  END TIME:  7:00 PM  FACILITATOR(S): Franco Denis LADC  TOPIC: BEH Group Therapy  Number of patients attending the group:  5  Group Length:  1.5 Hours    Group Therapy Type: Psychoeducation    Summary of Group / Topics Discussed:    Mindfulness and Values      Group Attendance:  Attended group session    Patient's response to the group topic/interactions:  cooperative with task    Patient appeared to be Actively participating.        Client specific details:  Pt identified habits and tendencies that cause himself and others pain, and Pt identified qualities of the person he wants to be. Pt participated in discussion about utilizing mindfulness to disengage these unhealthy habits and seek to embody the qualities he desires for himself..